# Patient Record
Sex: MALE | Race: WHITE | Employment: FULL TIME | URBAN - METROPOLITAN AREA
[De-identification: names, ages, dates, MRNs, and addresses within clinical notes are randomized per-mention and may not be internally consistent; named-entity substitution may affect disease eponyms.]

---

## 2022-03-20 ENCOUNTER — APPOINTMENT (OUTPATIENT)
Dept: GENERAL RADIOLOGY | Age: 72
End: 2022-03-20
Attending: EMERGENCY MEDICINE
Payer: MEDICARE

## 2022-03-20 ENCOUNTER — HOSPITAL ENCOUNTER (EMERGENCY)
Age: 72
Discharge: HOME | End: 2022-03-20
Attending: EMERGENCY MEDICINE
Payer: MEDICARE

## 2022-03-20 VITALS
WEIGHT: 200 LBS | HEART RATE: 81 BPM | BODY MASS INDEX: 30.31 KG/M2 | HEIGHT: 68 IN | RESPIRATION RATE: 17 BRPM | TEMPERATURE: 97.5 F | SYSTOLIC BLOOD PRESSURE: 161 MMHG | OXYGEN SATURATION: 97 % | DIASTOLIC BLOOD PRESSURE: 101 MMHG

## 2022-03-20 DIAGNOSIS — R07.9 CHEST PAIN, UNSPECIFIED TYPE: Primary | ICD-10-CM

## 2022-03-20 LAB
ALBUMIN SERPL-MCNC: 3.8 G/DL (ref 3.5–5)
ALBUMIN/GLOB SERPL: 1.2 {RATIO} (ref 1.1–2.2)
ALP SERPL-CCNC: 51 U/L (ref 45–117)
ALT SERPL-CCNC: 32 U/L (ref 12–78)
ANION GAP SERPL CALC-SCNC: 6 MMOL/L (ref 5–15)
AST SERPL-CCNC: 20 U/L (ref 15–37)
BASOPHILS # BLD: 0 K/UL (ref 0–0.1)
BASOPHILS NFR BLD: 1 % (ref 0–1)
BILIRUB SERPL-MCNC: 0.4 MG/DL (ref 0.2–1)
BUN SERPL-MCNC: 18 MG/DL (ref 6–20)
BUN/CREAT SERPL: 12 (ref 12–20)
CALCIUM SERPL-MCNC: 9.1 MG/DL (ref 8.5–10.1)
CHLORIDE SERPL-SCNC: 110 MMOL/L (ref 97–108)
CO2 SERPL-SCNC: 25 MMOL/L (ref 21–32)
CREAT SERPL-MCNC: 1.47 MG/DL (ref 0.7–1.3)
D DIMER PPP FEU-MCNC: 0.28 MG/L FEU (ref 0–0.65)
DIFFERENTIAL METHOD BLD: NORMAL
EOSINOPHIL # BLD: 0.1 K/UL (ref 0–0.4)
EOSINOPHIL NFR BLD: 2 % (ref 0–7)
ERYTHROCYTE [DISTWIDTH] IN BLOOD BY AUTOMATED COUNT: 13.6 % (ref 11.5–14.5)
GLOBULIN SER CALC-MCNC: 3.2 G/DL (ref 2–4)
GLUCOSE SERPL-MCNC: 97 MG/DL (ref 65–100)
HCT VFR BLD AUTO: 44.9 % (ref 36.6–50.3)
HGB BLD-MCNC: 15.3 G/DL (ref 12.1–17)
IMM GRANULOCYTES # BLD AUTO: 0 K/UL (ref 0–0.04)
IMM GRANULOCYTES NFR BLD AUTO: 0 % (ref 0–0.5)
LYMPHOCYTES # BLD: 1.6 K/UL (ref 0.8–3.5)
LYMPHOCYTES NFR BLD: 24 % (ref 12–49)
MCH RBC QN AUTO: 29.7 PG (ref 26–34)
MCHC RBC AUTO-ENTMCNC: 34.1 G/DL (ref 30–36.5)
MCV RBC AUTO: 87 FL (ref 80–99)
MONOCYTES # BLD: 0.6 K/UL (ref 0–1)
MONOCYTES NFR BLD: 8 % (ref 5–13)
NEUTS SEG # BLD: 4.4 K/UL (ref 1.8–8)
NEUTS SEG NFR BLD: 65 % (ref 32–75)
NRBC # BLD: 0 K/UL (ref 0–0.01)
NRBC BLD-RTO: 0 PER 100 WBC
PLATELET # BLD AUTO: 221 K/UL (ref 150–400)
PMV BLD AUTO: 9 FL (ref 8.9–12.9)
POTASSIUM SERPL-SCNC: 4.4 MMOL/L (ref 3.5–5.1)
PROT SERPL-MCNC: 7 G/DL (ref 6.4–8.2)
RBC # BLD AUTO: 5.16 M/UL (ref 4.1–5.7)
SODIUM SERPL-SCNC: 141 MMOL/L (ref 136–145)
TROPONIN-HIGH SENSITIVITY: 15 NG/L (ref 0–76)
TROPONIN-HIGH SENSITIVITY: 18 NG/L (ref 0–76)
WBC # BLD AUTO: 6.7 K/UL (ref 4.1–11.1)

## 2022-03-20 PROCEDURE — 99285 EMERGENCY DEPT VISIT HI MDM: CPT

## 2022-03-20 PROCEDURE — 84484 ASSAY OF TROPONIN QUANT: CPT

## 2022-03-20 PROCEDURE — 71046 X-RAY EXAM CHEST 2 VIEWS: CPT

## 2022-03-20 PROCEDURE — 36415 COLL VENOUS BLD VENIPUNCTURE: CPT

## 2022-03-20 PROCEDURE — 74011250636 HC RX REV CODE- 250/636: Performed by: PHYSICIAN ASSISTANT

## 2022-03-20 PROCEDURE — 93005 ELECTROCARDIOGRAM TRACING: CPT

## 2022-03-20 PROCEDURE — 74011250637 HC RX REV CODE- 250/637: Performed by: PHYSICIAN ASSISTANT

## 2022-03-20 PROCEDURE — 80053 COMPREHEN METABOLIC PANEL: CPT

## 2022-03-20 PROCEDURE — 85379 FIBRIN DEGRADATION QUANT: CPT

## 2022-03-20 PROCEDURE — 85025 COMPLETE CBC W/AUTO DIFF WBC: CPT

## 2022-03-20 RX ORDER — GUAIFENESIN 100 MG/5ML
162 LIQUID (ML) ORAL DAILY
Status: DISCONTINUED | OUTPATIENT
Start: 2022-03-20 | End: 2022-03-20

## 2022-03-20 RX ADMIN — SODIUM CHLORIDE 1000 ML: 9 INJECTION, SOLUTION INTRAVENOUS at 13:44

## 2022-03-20 RX ADMIN — ASPIRIN 162 MG: 81 TABLET, CHEWABLE ORAL at 12:03

## 2022-03-20 NOTE — ED PROVIDER NOTES
74 y/o male presenting with complaint of chest pain. The patient states that for the past 2 months he has had intermittent episodes of exertional chest pain which resolved with rest.  This morning around 8:00 he was walking his dog and noticed the chest pain again, somewhat worse than previous episodes, prompting his visit to the ED. The pain has resolved at this time. He denies any associated diaphoresis, lightheadedness, shortness of breath, nausea or vomiting. He has not seen a doctor in a long time and has no known medical diagnoses. No recent surgeries/hospitalizations, long travel, history of malignancy, hemoptysis, asymmetrical leg pain/swelling, or previous DVT/PE. The history is provided by the patient. No past medical history on file. No past surgical history on file. No family history on file. Social History     Socioeconomic History    Marital status:      Spouse name: Not on file    Number of children: Not on file    Years of education: Not on file    Highest education level: Not on file   Occupational History    Not on file   Tobacco Use    Smoking status: Not on file    Smokeless tobacco: Not on file   Substance and Sexual Activity    Alcohol use: Not on file    Drug use: Not on file    Sexual activity: Not on file   Other Topics Concern    Not on file   Social History Narrative    Not on file     Social Determinants of Health     Financial Resource Strain:     Difficulty of Paying Living Expenses: Not on file   Food Insecurity:     Worried About Running Out of Food in the Last Year: Not on file    Stew of Food in the Last Year: Not on file   Transportation Needs:     Lack of Transportation (Medical): Not on file    Lack of Transportation (Non-Medical):  Not on file   Physical Activity:     Days of Exercise per Week: Not on file    Minutes of Exercise per Session: Not on file   Stress:     Feeling of Stress : Not on file   Social Connections:     Frequency of Communication with Friends and Family: Not on file    Frequency of Social Gatherings with Friends and Family: Not on file    Attends Sikh Services: Not on file    Active Member of Clubs or Organizations: Not on file    Attends Club or Organization Meetings: Not on file    Marital Status: Not on file   Intimate Partner Violence:     Fear of Current or Ex-Partner: Not on file    Emotionally Abused: Not on file    Physically Abused: Not on file    Sexually Abused: Not on file   Housing Stability:     Unable to Pay for Housing in the Last Year: Not on file    Number of [place] in the Last Year: Not on file    Unstable Housing in the Last Year: Not on file         ALLERGIES: Patient has no known allergies. Review of Systems   Constitutional: Negative for chills, diaphoresis and fever. HENT: Negative for congestion. Respiratory: Negative for cough and shortness of breath. Cardiovascular: Positive for chest pain. Negative for palpitations and leg swelling. Gastrointestinal: Negative for diarrhea, nausea and vomiting. Musculoskeletal: Negative for myalgias. Skin: Negative for wound. Neurological: Negative for syncope and light-headedness. Vitals:    03/20/22 1058   BP: (!) 162/100   Pulse: 83   Resp: 20   Temp: 97.5 °F (36.4 °C)   SpO2: 96%   Weight: 90.7 kg (200 lb)   Height: 5' 8\" (1.727 m)            Physical Exam  Vitals and nursing note reviewed. Constitutional:       General: He is not in acute distress. Appearance: He is well-developed. He is not diaphoretic. HENT:      Head: Normocephalic and atraumatic. Eyes:      Conjunctiva/sclera: Conjunctivae normal.   Cardiovascular:      Rate and Rhythm: Normal rate and regular rhythm. Heart sounds: Normal heart sounds. Pulmonary:      Effort: Pulmonary effort is normal.      Breath sounds: Normal breath sounds. Chest:      Comments: No chest wall tenderness.   Abdominal:      General: There is no distension. Palpations: Abdomen is soft. Tenderness: There is no abdominal tenderness. There is no guarding. Skin:     General: Skin is warm and dry. Neurological:      Mental Status: He is alert and oriented to person, place, and time. [facility]  ED Course as of 03/20/22 1155   Sun Mar 20, 2022   1054 EKG obtained at 1052 showing sinus rhythm, rate 86, normal intervals, normal axis, no acute appearing findings, no prior EKG available for comparison. [JE]      ED Course User Index  [JE] Krys Weiss MD       Procedures        72 y/o male presenting with complaint of chest pain. The patient is well-appearing in no acute distress. D-dimer negative, low clinical suspicion for PE. EKG without acute ischemic changes. Initial HS-troponin 15, repeat 18. Will consult cardiology. Consult Note - 3:15 PM  I have spoken with Dr. Nella Laird, discussed patient presentation, exam and diagnostic results. Plan is for outpatient stress test tomorrow or the next day. Plan of care discussed with the patient, who verbalizes understanding and agreement. Strict ED return precautions discussed and provided in writing at time of discharge.

## 2022-03-20 NOTE — ED TRIAGE NOTES
Patient arrives with c/o intermittent mid-sternal chest pain/pressure x 2 months. Reports having covid-19 in January. Denies pain radiating to other areas, N/V, cough, SOB. States pian 0/10 in triage.

## 2022-03-21 ENCOUNTER — TELEPHONE (OUTPATIENT)
Dept: CARDIOLOGY CLINIC | Age: 72
End: 2022-03-21

## 2022-03-21 DIAGNOSIS — R07.9 CHEST PAIN, UNSPECIFIED TYPE: Primary | ICD-10-CM

## 2022-03-21 NOTE — TELEPHONE ENCOUNTER
Patient was seen in the [facility] ER 03/20/2022; patient was advised that someone from Dr. Lily Cornelius office would contact him for follow up.  He states ho one has called him              PHONE:[phone]

## 2022-03-22 ENCOUNTER — TELEPHONE (OUTPATIENT)
Dept: CARDIOLOGY CLINIC | Age: 72
End: 2022-03-22

## 2022-03-22 LAB
ATRIAL RATE: 86 BPM
CALCULATED P AXIS, ECG09: 61 DEGREES
CALCULATED R AXIS, ECG10: 55 DEGREES
CALCULATED T AXIS, ECG11: 88 DEGREES
DIAGNOSIS, 93000: NORMAL
P-R INTERVAL, ECG05: 174 MS
Q-T INTERVAL, ECG07: 334 MS
QRS DURATION, ECG06: 74 MS
QTC CALCULATION (BEZET), ECG08: 399 MS
VENTRICULAR RATE, ECG03: 86 BPM

## 2022-03-22 NOTE — TELEPHONE ENCOUNTER
All orders entered per verbal orders of Dr. Luis Eduardo Lopez       needs a stress Nuc and CAC for chest pain when available. Also an appt with me in next two weeks. Unable to reach pt.  Message left \"for call back\"

## 2022-03-22 NOTE — TELEPHONE ENCOUNTER
Spoke with The pt, identified the pt with name and . Informed the pt of Dr Kelly Leblanc of test. Explained both test and gave the following Lexiscan instructions:    During this test there is a lot of waiting time, so bring something to help you pass the time. Wear comfortable clothing (shorts or pants with a shirt or blouse)For this test you will be getting IV medication. Please wear short sleeves or stretchy material sleeves. Please do not wear any metal on the front of your shirt, like snaps or zipper. And no underwire bras    Do not eat or drink anything, except water, for at least 4 hours prior to your appointment. Avoid tobacco products for at least 12 hour prior to your test.    Do not drink or eat anything containing caffeine, including but not limited to the following, chocolate, regular and decaffeinated coffee, soft drinks, or tea for at least 24 hours prior to your test. Please check any migrain medication and multivitamins, they sometimes have caffeine in them. IF YOU CONSUME CAFFEINE WE CAN NOT DO THE TEST. Do not hold your scheduled medication the morning prior to your test. If you are a diabetic, please ask your physician how to adjust your food and insulin prior to your test. Please bring all medications you are currently taking. You will need to inform our office if you are pregnant, nursing, or think you may be pregnant. Your test will be performed on a 1 day protocol. This is determined by your height, weight, and other risk factors. For a 2 day test, please allow for 2 hours in the office each day. For a 1 day test, please allow for 4 hours in the office that day.     The radioactive isotope used for your testing is different from any of the dyes that are commonly used in x-ray procedures, and is ordered specially for your test.     Please call to cancel or reschedule your appointment at least 24 hours prior to your scheduled appointment to avoid being billed for the expensive isotope. I obtained an e mail to which I can send these instructions. [email or web address]

## 2022-03-31 ENCOUNTER — TELEPHONE (OUTPATIENT)
Dept: CARDIOLOGY CLINIC | Age: 72
End: 2022-03-31

## 2022-03-31 NOTE — TELEPHONE ENCOUNTER
Patient verified per protocol; Nuclear stress test appointment reminder given with date, time, location, and prep. Patient verbalized understanding.

## 2022-04-01 ENCOUNTER — OFFICE VISIT (OUTPATIENT)
Dept: CARDIOLOGY CLINIC | Age: 72
End: 2022-04-01
Payer: MEDICARE

## 2022-04-01 ENCOUNTER — TELEPHONE (OUTPATIENT)
Dept: CARDIOLOGY CLINIC | Age: 72
End: 2022-04-01

## 2022-04-01 ENCOUNTER — ANCILLARY PROCEDURE (OUTPATIENT)
Dept: CARDIOLOGY CLINIC | Age: 72
End: 2022-04-01
Payer: MEDICARE

## 2022-04-01 VITALS
HEART RATE: 77 BPM | DIASTOLIC BLOOD PRESSURE: 79 MMHG | SYSTOLIC BLOOD PRESSURE: 128 MMHG | OXYGEN SATURATION: 98 % | BODY MASS INDEX: 30.01 KG/M2 | HEIGHT: 68 IN | WEIGHT: 198 LBS

## 2022-04-01 VITALS — WEIGHT: 200 LBS | HEIGHT: 68 IN | BODY MASS INDEX: 30.31 KG/M2

## 2022-04-01 DIAGNOSIS — I20.8 STABLE ANGINA PECTORIS (HCC): Primary | ICD-10-CM

## 2022-04-01 DIAGNOSIS — R07.9 CHEST PAIN, UNSPECIFIED TYPE: ICD-10-CM

## 2022-04-01 DIAGNOSIS — Z09 HOSPITAL DISCHARGE FOLLOW-UP: ICD-10-CM

## 2022-04-01 LAB
NUC STRESS EJECTION FRACTION: 58 %
STRESS BASELINE DIAS BP: 96 MMHG
STRESS BASELINE HR: 71 BPM
STRESS BASELINE ST DEPRESSION: 0 MM
STRESS BASELINE SYS BP: 152 MMHG
STRESS ESTIMATED WORKLOAD: 1 METS
STRESS EXERCISE DUR MIN: 0 MIN
STRESS EXERCISE DUR SEC: 0 SEC
STRESS O2 SAT PEAK: 98 %
STRESS O2 SAT REST: 98 %
STRESS PEAK DIAS BP: 82 MMHG
STRESS PEAK SYS BP: 122 MMHG
STRESS PERCENT HR ACHIEVED: 64 %
STRESS POST PEAK HR: 97 BPM
STRESS RATE PRESSURE PRODUCT: NORMAL BPM*MMHG
STRESS ST DEPRESSION: 0 MM
STRESS TARGET HR: 151 BPM

## 2022-04-01 PROCEDURE — A9500 TC99M SESTAMIBI: HCPCS | Performed by: INTERNAL MEDICINE

## 2022-04-01 PROCEDURE — 99204 OFFICE O/P NEW MOD 45 MIN: CPT | Performed by: INTERNAL MEDICINE

## 2022-04-01 PROCEDURE — G0463 HOSPITAL OUTPT CLINIC VISIT: HCPCS | Performed by: INTERNAL MEDICINE

## 2022-04-01 PROCEDURE — 1111F DSCHRG MED/CURRENT MED MERGE: CPT | Performed by: INTERNAL MEDICINE

## 2022-04-01 PROCEDURE — 93018 CV STRESS TEST I&R ONLY: CPT | Performed by: INTERNAL MEDICINE

## 2022-04-01 PROCEDURE — 78452 HT MUSCLE IMAGE SPECT MULT: CPT | Performed by: INTERNAL MEDICINE

## 2022-04-01 PROCEDURE — 93017 CV STRESS TEST TRACING ONLY: CPT | Performed by: INTERNAL MEDICINE

## 2022-04-01 PROCEDURE — 93016 CV STRESS TEST SUPVJ ONLY: CPT | Performed by: INTERNAL MEDICINE

## 2022-04-01 RX ORDER — ROSUVASTATIN CALCIUM 20 MG/1
20 TABLET, COATED ORAL
Qty: 90 TABLET | Refills: 4 | Status: SHIPPED | OUTPATIENT
Start: 2022-04-01

## 2022-04-01 RX ORDER — NITROGLYCERIN 0.4 MG/1
0.4 TABLET SUBLINGUAL AS NEEDED
Status: DISCONTINUED | OUTPATIENT
Start: 2022-04-01 | End: 2022-04-01 | Stop reason: SDUPTHER

## 2022-04-01 RX ORDER — TETRAKIS(2-METHOXYISOBUTYLISOCYANIDE)COPPER(I) TETRAFLUOROBORATE 1 MG/ML
8.8 INJECTION, POWDER, LYOPHILIZED, FOR SOLUTION INTRAVENOUS ONCE
Status: COMPLETED | OUTPATIENT
Start: 2022-04-01 | End: 2022-04-01

## 2022-04-01 RX ORDER — ASPIRIN 81 MG/1
81 TABLET ORAL DAILY
Qty: 90 TABLET | Refills: 4 | Status: SHIPPED | OUTPATIENT
Start: 2022-04-01

## 2022-04-01 RX ORDER — AMINOPHYLLINE 25 MG/ML
100 INJECTION, SOLUTION INTRAVENOUS ONCE
Status: COMPLETED | OUTPATIENT
Start: 2022-04-01 | End: 2022-04-01

## 2022-04-01 RX ORDER — LISINOPRIL 10 MG/1
10 TABLET ORAL DAILY
COMMUNITY

## 2022-04-01 RX ORDER — NITROGLYCERIN 0.4 MG/1
TABLET SUBLINGUAL
Qty: 25 TABLET | Refills: 4 | Status: SHIPPED | OUTPATIENT
Start: 2022-04-01

## 2022-04-01 RX ORDER — TETRAKIS(2-METHOXYISOBUTYLISOCYANIDE)COPPER(I) TETRAFLUOROBORATE 1 MG/ML
24.6 INJECTION, POWDER, LYOPHILIZED, FOR SOLUTION INTRAVENOUS ONCE
Status: COMPLETED | OUTPATIENT
Start: 2022-04-01 | End: 2022-04-01

## 2022-04-01 RX ADMIN — TECHNETIUM TC 99M SESTAMIBI 8.8 MILLICURIE: 1 INJECTION, POWDER, FOR SOLUTION INTRAVENOUS at 08:15

## 2022-04-01 RX ADMIN — AMINOPHYLLINE 100 MG: 25 INJECTION, SOLUTION INTRAVENOUS at 09:49

## 2022-04-01 RX ADMIN — TECHNETIUM TC 99M SESTAMIBI 24.6 MILLICURIE: 1 INJECTION, POWDER, FOR SOLUTION INTRAVENOUS at 09:30

## 2022-04-01 RX ADMIN — REGADENOSON 0.4 MG: 0.08 INJECTION, SOLUTION INTRAVENOUS at 09:41

## 2022-04-01 NOTE — PROGRESS NOTES
Reason to see patient: Chest pain. Referring: Bette Pearce MD    HPI: Gallo Reyes is a 73 y.o. male with no significant past medical history is here for evaluation of symptoms of chest pain. Symptoms started few months ago and the first episode was in the December 2021 when he had an episode of chest pain which lasted for few minutes it was moderate intensity however it resolved spontaneously and thereafter he did not have symptoms until recentlyOn March 19 while he was walking his dog has symptoms of similar chest pain which improved upon resting. He had another bout of chest pain the following day while he was active and associated with the symptoms of chest pain he also felt some tingling or numbness sensation in both the arms. Having a third episode of chest pain he came to the ER by which time his symptoms of chest pain had resolved. His EKG in the ER performed on March 20, 2022 was personally reviewed. EKG at that time did not demonstrate anything significant. His cardiac enzyme initially was troponin at 15 with a follow-up at 18. ER consultation with the ER physician at that time decision making was to discharge him from the ER and to follow-up with a stress test.  He is coming into our office today and had a stress test performed. Of note he does not have any previous cardiac history. He does not smoke tobacco.  His only risk factor from the family history standpoint as his maternal grandmother had CAD. EKG performed on March 20, 2022 was personally reviewed. EKG demonstrated normal sinus rhythm, normal axis, normal intervals, nonspecific T wave changes in leads V1 and V2. Lab results including troponins were personally reviewed. Plan:    1. Chest pain: Symptoms classic for angina. EKG nonischemic.   Troponins were normal.  Stress test today in our office demonstrated medium sized area of moderate intensity ischemia of the anteroapical wall indicating significant disease in the mid LAD. All other myocardial territories appear to be normally perfusing. He did have effect of Lexiscan with significant drop in his blood pressure at the time of the stress test which promptly improved on reversing the agents with aminophhyllin. We will proceed with left heart catheterization. Risk and benefits were explained. He is in agreement to proceed. In the meantime I have asked him to start aspirin 81 mg p.o. daily, I will also start him on Crestor 20 mg p.o. daily. I will give him nitroglycerin 0.4 mg tablets to be taken as needed for chest pain. Also we discussed about if he has increasing anginal symptoms then do not wait and come to the ER for evaluation and further management. 2.  Recent diagnosis of hypertension: He was started on lisinopril. Continue this. ATTENTION:   This medical record was transcribed using an electronic medical records/speech recognition system. Although proofread, it may and can contain electronic, spelling and other errors. Corrections may be executed at a later time. Please feel free to contact us for any clarifications as needed.            Social History     Socioeconomic History    Marital status:      Spouse name: Not on file    Number of children: Not on file    Years of education: Not on file    Highest education level: Not on file   Occupational History    Not on file   Tobacco Use    Smoking status: Never Smoker    Smokeless tobacco: Never Used   Substance and Sexual Activity    Alcohol use: Not Currently     Alcohol/week: 2.0 standard drinks     Types: 2 Shots of liquor per week    Drug use: Not on file    Sexual activity: Not on file   Other Topics Concern    Not on file   Social History Narrative    Not on file     Social Determinants of Health     Financial Resource Strain:     Difficulty of Paying Living Expenses: Not on file   Food Insecurity:     Worried About Running Out of Food in the Last Year: Not on file  Ran Out of Food in the Last Year: Not on file   Transportation Needs:     Lack of Transportation (Medical): Not on file    Lack of Transportation (Non-Medical): Not on file   Physical Activity:     Days of Exercise per Week: Not on file    Minutes of Exercise per Session: Not on file   Stress:     Feeling of Stress : Not on file   Social Connections:     Frequency of Communication with Friends and Family: Not on file    Frequency of Social Gatherings with Friends and Family: Not on file    Attends Gnosticist Services: Not on file    Active Member of [address] [place] [address] or Organizations: Not on file    Attends Club or Organization Meetings: Not on file    Marital Status: Not on file   Intimate Partner Violence:     Fear of Current or Ex-Partner: Not on file    Emotionally Abused: Not on file    Physically Abused: Not on file    Sexually Abused: Not on file   Housing Stability:     Unable to Pay for Housing in the Last Year: Not on file    Number of [place] in the Last Year: Not on file    Unstable Housing in the Last Year: Not on file         No Known Allergies         Current Outpatient Medications   Medication Sig Dispense Refill    lisinopriL (PRINIVIL, ZESTRIL) 10 mg tablet Take 10 mg by mouth daily. ROS:  12 point review of systems was performed.  All negative except for HPI     Physical Exam:  Visit Vitals  /79 (BP 1 Location: Left upper arm, BP Patient Position: Sitting)   Pulse 77   Ht 5' 8\" (1.727 m)   Wt 198 lb (89.8 kg)   SpO2 98%   BMI 30.11 kg/m²       Gen:  Well-developed, well-nourished, in no acute distress  HEENT:  Pink conjunctivae, PERRL, hearing intact to voice, moist mucous membranes  Neck:  Supple, without masses, thyroid non-tender  Resp:  No accessory muscle use, clear breath sounds without wheezes rales or rhonchi  Card:  No murmurs, normal S1, S2 without thrills, bruits or peripheral edema  Abd:  Soft, non-tender, non-distended, normoactive bowel sounds are present, no palpable organomegaly and no detectable hernias  Lymph:  No cervical or inguinal adenopathy  Musc:  No cyanosis or clubbing  Skin:  No rashes or ulcers, skin turgor is good  Neuro:  Cranial nerves are grossly intact, no focal motor weakness, follows commands appropriately  Psych:  Good insight, oriented to person, place and time, alert     Labs:     Lab Results   Component Value Date/Time    WBC 6.7 03/20/2022 11:50 AM    HGB 15.3 03/20/2022 11:50 AM    HCT 44.9 03/20/2022 11:50 AM    PLATELET 183 32/99/2072 11:50 AM    MCV 87.0 03/20/2022 11:50 AM     Lab Results   Component Value Date/Time    Glucose 97 03/20/2022 11:50 AM    Creatinine 1.47 (H) 03/20/2022 11:50 AM      No results found for: CHOL, CHOLPOCT, HDL, LDL, LDLC, LDLCPOC, LDLCEXT, TRIGL, TGLPOCT, CHHD, CHHDX  Lab Results   Component Value Date/Time    ALT (SGPT) 32 03/20/2022 11:50 AM    Alk.  phosphatase 51 03/20/2022 11:50 AM    Bilirubin, total 0.4 03/20/2022 11:50 AM    Albumin 3.8 03/20/2022 11:50 AM    Protein, total 7.0 03/20/2022 11:50 AM    PLATELET 461 03/99/1452 11:50 AM     No results found for: INR, INREXT, PTMR, PTP, PT1, PT2, INREXT   Lab Results   Component Value Date/Time    GFR est non-AA 47 (L) 03/20/2022 11:50 AM    GFR est AA 58 (L) 03/20/2022 11:50 AM    Creatinine 1.47 (H) 03/20/2022 11:50 AM    BUN 18 03/20/2022 11:50 AM    Sodium 141 03/20/2022 11:50 AM    Potassium 4.4 03/20/2022 11:50 AM    Chloride 110 (H) 03/20/2022 11:50 AM    CO2 25 03/20/2022 11:50 AM     No results found for: PSA, PSA2, PSAR1, PSA1, PSAR2, PSA3, PSAR3, [id number], [id number]  No results found for: TSH, TSH2, TSH3, TSHP, TSHELE, TSHEXT, TT3, T3U, T3UP, FRT3, FT3, FT4, FT4P, T4, T4P, FT4T, TT7, TSHEXT   Lab Results   Component Value Date/Time    Glucose 97 03/20/2022 11:50 AM      No results found for: CPK, RCK1, RCK2, RCK3, RCK4, CKMB, CKNDX, CKND1, TROPT, TROIQ, BNPP, BNP   No results found for: BNP, BNPP, BNPPPOC, XBNPT, BNPNT   Lab Results Component Value Date/Time    Sodium 141 03/20/2022 11:50 AM    Potassium 4.4 03/20/2022 11:50 AM    Chloride 110 (H) 03/20/2022 11:50 AM    CO2 25 03/20/2022 11:50 AM    Anion gap 6 03/20/2022 11:50 AM    Glucose 97 03/20/2022 11:50 AM    BUN 18 03/20/2022 11:50 AM    Creatinine 1.47 (H) 03/20/2022 11:50 AM    BUN/Creatinine ratio 12 03/20/2022 11:50 AM    GFR est AA 58 (L) 03/20/2022 11:50 AM    GFR est non-AA 47 (L) 03/20/2022 11:50 AM    Calcium 9.1 03/20/2022 11:50 AM      Lab Results   Component Value Date/Time    Sodium 141 03/20/2022 11:50 AM    Potassium 4.4 03/20/2022 11:50 AM    Chloride 110 (H) 03/20/2022 11:50 AM    CO2 25 03/20/2022 11:50 AM    Anion gap 6 03/20/2022 11:50 AM    Glucose 97 03/20/2022 11:50 AM    BUN 18 03/20/2022 11:50 AM    Creatinine 1.47 (H) 03/20/2022 11:50 AM    BUN/Creatinine ratio 12 03/20/2022 11:50 AM    GFR est AA 58 (L) 03/20/2022 11:50 AM    GFR est non-AA 47 (L) 03/20/2022 11:50 AM    Calcium 9.1 03/20/2022 11:50 AM    Bilirubin, total 0.4 03/20/2022 11:50 AM    ALT (SGPT) 32 03/20/2022 11:50 AM    Alk. phosphatase 51 03/20/2022 11:50 AM    Protein, total 7.0 03/20/2022 11:50 AM    Albumin 3.8 03/20/2022 11:50 AM    Globulin 3.2 03/20/2022 11:50 AM    A-G Ratio 1.2 03/20/2022 11:50 AM      No results found for: HBA1C, MAV5RBPN, UMM8KJZD, NWL7GULP      No results for input(s): CPK, CKMB, TROIQ in the last 72 hours. No lab exists for component: CKQMB, CPKMB      Jose Khan MD, [facility] [place]

## 2022-04-01 NOTE — TELEPHONE ENCOUNTER
Refill per VO of Dr. Darlene Torres:    Last appt: Visit date not found    No future appointments. Requested Prescriptions     Pending Prescriptions Disp Refills    nitroglycerin (NITROSTAT) 0.4 mg SL tablet 25 Tablet 4     Sig: Dissolve one tablet under tongue as needed for chest pain. Wait 5 minutes, if no relief, take one more tablet, call 911. Can take up to 3 doses. Unable to reach pt.  Message left \"that I have resent the Rx\"

## 2022-04-01 NOTE — TELEPHONE ENCOUNTER
Patient is calling to check on the medication Nitroglycerin 0.4mg , would like to know when it would be filled. Please Advise. Pharmacy Verified.     [phone]

## 2022-04-01 NOTE — PROGRESS NOTES
Chief Complaint   Patient presents with    Cardiac Testing     NUC.  STRESS THIS MORNING    Chest Pain   [facility] Follow Up     ED 3/20=CP     Visit Vitals  /79 (BP 1 Location: Left upper arm, BP Patient Position: Sitting)   Pulse 77   Ht 5' 8\" (1.727 m)   Wt 198 lb (89.8 kg)   SpO2 98%   BMI 30.11 kg/m²     Chest pain OCCASIONALLY WITH EXCERTION    Palpations denied    SOB denied    Dizziness denied    Swelling in hands/feet denied     Recent hospital stays denied

## 2022-04-01 NOTE — H&P (VIEW-ONLY)
Reason to see patient: Chest pain. Referring: Bette Pearce MD    HPI: Gallo Reyes is a 73 y.o. male with no significant past medical history is here for evaluation of symptoms of chest pain. Symptoms started few months ago and the first episode was in the December 2021 when he had an episode of chest pain which lasted for few minutes it was moderate intensity however it resolved spontaneously and thereafter he did not have symptoms until recentlyOn March 19 while he was walking his dog has symptoms of similar chest pain which improved upon resting. He had another bout of chest pain the following day while he was active and associated with the symptoms of chest pain he also felt some tingling or numbness sensation in both the arms. Having a third episode of chest pain he came to the ER by which time his symptoms of chest pain had resolved. His EKG in the ER performed on March 20, 2022 was personally reviewed. EKG at that time did not demonstrate anything significant. His cardiac enzyme initially was troponin at 15 with a follow-up at 18. ER consultation with the ER physician at that time decision making was to discharge him from the ER and to follow-up with a stress test.  He is coming into our office today and had a stress test performed. Of note he does not have any previous cardiac history. He does not smoke tobacco.  His only risk factor from the family history standpoint as his maternal grandmother had CAD. EKG performed on March 20, 2022 was personally reviewed. EKG demonstrated normal sinus rhythm, normal axis, normal intervals, nonspecific T wave changes in leads V1 and V2. Lab results including troponins were personally reviewed. Plan:    1. Chest pain: Symptoms classic for angina. EKG nonischemic.   Troponins were normal.  Stress test today in our office demonstrated medium sized area of moderate intensity ischemia of the anteroapical wall indicating significant disease in the mid LAD. All other myocardial territories appear to be normally perfusing. He did have effect of Lexiscan with significant drop in his blood pressure at the time of the stress test which promptly improved on reversing the agents with aminophhyllin. We will proceed with left heart catheterization. Risk and benefits were explained. He is in agreement to proceed. In the meantime I have asked him to start aspirin 81 mg p.o. daily, I will also start him on Crestor 20 mg p.o. daily. I will give him nitroglycerin 0.4 mg tablets to be taken as needed for chest pain. Also we discussed about if he has increasing anginal symptoms then do not wait and come to the ER for evaluation and further management. 2.  Recent diagnosis of hypertension: He was started on lisinopril. Continue this. ATTENTION:   This medical record was transcribed using an electronic medical records/speech recognition system. Although proofread, it may and can contain electronic, spelling and other errors. Corrections may be executed at a later time. Please feel free to contact us for any clarifications as needed.            Social History     Socioeconomic History    Marital status:      Spouse name: Not on file    Number of children: Not on file    Years of education: Not on file    Highest education level: Not on file   Occupational History    Not on file   Tobacco Use    Smoking status: Never Smoker    Smokeless tobacco: Never Used   Substance and Sexual Activity    Alcohol use: Not Currently     Alcohol/week: 2.0 standard drinks     Types: 2 Shots of liquor per week    Drug use: Not on file    Sexual activity: Not on file   Other Topics Concern    Not on file   Social History Narrative    Not on file     Social Determinants of Health     Financial Resource Strain:     Difficulty of Paying Living Expenses: Not on file   Food Insecurity:     Worried About Running Out of Food in the Last Year: Not on file  Ran Out of Food in the Last Year: Not on file   Transportation Needs:     Lack of Transportation (Medical): Not on file    Lack of Transportation (Non-Medical): Not on file   Physical Activity:     Days of Exercise per Week: Not on file    Minutes of Exercise per Session: Not on file   Stress:     Feeling of Stress : Not on file   Social Connections:     Frequency of Communication with Friends and Family: Not on file    Frequency of Social Gatherings with Friends and Family: Not on file    Attends Hoahaoism Services: Not on file    Active Member of [address] [place] [address] or Organizations: Not on file    Attends Club or Organization Meetings: Not on file    Marital Status: Not on file   Intimate Partner Violence:     Fear of Current or Ex-Partner: Not on file    Emotionally Abused: Not on file    Physically Abused: Not on file    Sexually Abused: Not on file   Housing Stability:     Unable to Pay for Housing in the Last Year: Not on file    Number of [place] in the Last Year: Not on file    Unstable Housing in the Last Year: Not on file         No Known Allergies         Current Outpatient Medications   Medication Sig Dispense Refill    lisinopriL (PRINIVIL, ZESTRIL) 10 mg tablet Take 10 mg by mouth daily. ROS:  12 point review of systems was performed.  All negative except for HPI     Physical Exam:  Visit Vitals  /79 (BP 1 Location: Left upper arm, BP Patient Position: Sitting)   Pulse 77   Ht 5' 8\" (1.727 m)   Wt 198 lb (89.8 kg)   SpO2 98%   BMI 30.11 kg/m²       Gen:  Well-developed, well-nourished, in no acute distress  HEENT:  Pink conjunctivae, PERRL, hearing intact to voice, moist mucous membranes  Neck:  Supple, without masses, thyroid non-tender  Resp:  No accessory muscle use, clear breath sounds without wheezes rales or rhonchi  Card:  No murmurs, normal S1, S2 without thrills, bruits or peripheral edema  Abd:  Soft, non-tender, non-distended, normoactive bowel sounds are present, no palpable organomegaly and no detectable hernias  Lymph:  No cervical or inguinal adenopathy  Musc:  No cyanosis or clubbing  Skin:  No rashes or ulcers, skin turgor is good  Neuro:  Cranial nerves are grossly intact, no focal motor weakness, follows commands appropriately  Psych:  Good insight, oriented to person, place and time, alert     Labs:     Lab Results   Component Value Date/Time    WBC 6.7 03/20/2022 11:50 AM    HGB 15.3 03/20/2022 11:50 AM    HCT 44.9 03/20/2022 11:50 AM    PLATELET 069 64/12/5650 11:50 AM    MCV 87.0 03/20/2022 11:50 AM     Lab Results   Component Value Date/Time    Glucose 97 03/20/2022 11:50 AM    Creatinine 1.47 (H) 03/20/2022 11:50 AM      No results found for: CHOL, CHOLPOCT, HDL, LDL, LDLC, LDLCPOC, LDLCEXT, TRIGL, TGLPOCT, CHHD, CHHDX  Lab Results   Component Value Date/Time    ALT (SGPT) 32 03/20/2022 11:50 AM    Alk.  phosphatase 51 03/20/2022 11:50 AM    Bilirubin, total 0.4 03/20/2022 11:50 AM    Albumin 3.8 03/20/2022 11:50 AM    Protein, total 7.0 03/20/2022 11:50 AM    PLATELET 291 40/36/5518 11:50 AM     No results found for: INR, INREXT, PTMR, PTP, PT1, PT2, INREXT   Lab Results   Component Value Date/Time    GFR est non-AA 47 (L) 03/20/2022 11:50 AM    GFR est AA 58 (L) 03/20/2022 11:50 AM    Creatinine 1.47 (H) 03/20/2022 11:50 AM    BUN 18 03/20/2022 11:50 AM    Sodium 141 03/20/2022 11:50 AM    Potassium 4.4 03/20/2022 11:50 AM    Chloride 110 (H) 03/20/2022 11:50 AM    CO2 25 03/20/2022 11:50 AM     No results found for: PSA, PSA2, PSAR1, PSA1, PSAR2, PSA3, PSAR3, [id number], [id number]  No results found for: TSH, TSH2, TSH3, TSHP, TSHELE, TSHEXT, TT3, T3U, T3UP, FRT3, FT3, FT4, FT4P, T4, T4P, FT4T, TT7, TSHEXT   Lab Results   Component Value Date/Time    Glucose 97 03/20/2022 11:50 AM      No results found for: CPK, RCK1, RCK2, RCK3, RCK4, CKMB, CKNDX, CKND1, TROPT, TROIQ, BNPP, BNP   No results found for: BNP, BNPP, BNPPPOC, XBNPT, BNPNT   Lab Results Component Value Date/Time    Sodium 141 03/20/2022 11:50 AM    Potassium 4.4 03/20/2022 11:50 AM    Chloride 110 (H) 03/20/2022 11:50 AM    CO2 25 03/20/2022 11:50 AM    Anion gap 6 03/20/2022 11:50 AM    Glucose 97 03/20/2022 11:50 AM    BUN 18 03/20/2022 11:50 AM    Creatinine 1.47 (H) 03/20/2022 11:50 AM    BUN/Creatinine ratio 12 03/20/2022 11:50 AM    GFR est AA 58 (L) 03/20/2022 11:50 AM    GFR est non-AA 47 (L) 03/20/2022 11:50 AM    Calcium 9.1 03/20/2022 11:50 AM      Lab Results   Component Value Date/Time    Sodium 141 03/20/2022 11:50 AM    Potassium 4.4 03/20/2022 11:50 AM    Chloride 110 (H) 03/20/2022 11:50 AM    CO2 25 03/20/2022 11:50 AM    Anion gap 6 03/20/2022 11:50 AM    Glucose 97 03/20/2022 11:50 AM    BUN 18 03/20/2022 11:50 AM    Creatinine 1.47 (H) 03/20/2022 11:50 AM    BUN/Creatinine ratio 12 03/20/2022 11:50 AM    GFR est AA 58 (L) 03/20/2022 11:50 AM    GFR est non-AA 47 (L) 03/20/2022 11:50 AM    Calcium 9.1 03/20/2022 11:50 AM    Bilirubin, total 0.4 03/20/2022 11:50 AM    ALT (SGPT) 32 03/20/2022 11:50 AM    Alk. phosphatase 51 03/20/2022 11:50 AM    Protein, total 7.0 03/20/2022 11:50 AM    Albumin 3.8 03/20/2022 11:50 AM    Globulin 3.2 03/20/2022 11:50 AM    A-G Ratio 1.2 03/20/2022 11:50 AM      No results found for: HBA1C, HUX5FEIN, XCI0SPWH, WDP4OJTU      No results for input(s): CPK, CKMB, TROIQ in the last 72 hours. No lab exists for component: CKQMB, CPKMB      Jose Day MD, [facility] [place]

## 2022-04-01 NOTE — PROGRESS NOTES
All orders entered per verbal orders of Dr. Bony Henriquez    ASA 81mg po daily. Crestor 20mg po daily. NTG 0.4mg SL as needed. Schedule for [facility] next week. José Miguel Cherry, is scheduling    Refill per VO of Dr. Leslie Mancera:    Last appt: Visit date not found    Future Appointments   Date Time Provider Vinny Anderson   4/1/2022  1:00 PM Kimmy Theodore MD [facility] AMB       Requested Prescriptions     Pending Prescriptions Disp Refills    aspirin delayed-release 81 mg tablet 90 Tablet 4     Sig: Take 1 Tablet by mouth daily.  rosuvastatin (CRESTOR) 20 mg tablet 90 Tablet 4     Sig: Take 1 Tablet by mouth nightly.     nitroglycerin (NITROSTAT) tablet 0.4 mg

## 2022-04-05 DIAGNOSIS — R07.9 CHEST PAIN, UNSPECIFIED TYPE: Primary | ICD-10-CM

## 2022-04-05 RX ORDER — SODIUM CHLORIDE 0.9 % (FLUSH) 0.9 %
5-40 SYRINGE (ML) INJECTION EVERY 8 HOURS
Status: CANCELLED | OUTPATIENT
Start: 2022-04-06

## 2022-04-05 RX ORDER — SODIUM CHLORIDE 0.9 % (FLUSH) 0.9 %
5-40 SYRINGE (ML) INJECTION AS NEEDED
Status: CANCELLED | OUTPATIENT
Start: 2022-04-06

## 2022-04-05 RX ORDER — SODIUM CHLORIDE 9 MG/ML
100 INJECTION, SOLUTION INTRAVENOUS CONTINUOUS
Status: CANCELLED | OUTPATIENT
Start: 2022-04-06

## 2022-04-06 ENCOUNTER — HOSPITAL ENCOUNTER (INPATIENT)
Age: 72
LOS: 1 days | Discharge: HOME | DRG: 247 | End: 2022-04-07
Attending: STUDENT IN AN ORGANIZED HEALTH CARE EDUCATION/TRAINING PROGRAM | Admitting: STUDENT IN AN ORGANIZED HEALTH CARE EDUCATION/TRAINING PROGRAM
Payer: MEDICARE

## 2022-04-06 DIAGNOSIS — I25.10 CORONARY ARTERY DISEASE INVOLVING NATIVE CORONARY ARTERY OF NATIVE HEART, UNSPECIFIED WHETHER ANGINA PRESENT: ICD-10-CM

## 2022-04-06 DIAGNOSIS — R07.9 CHEST PAIN, UNSPECIFIED TYPE: ICD-10-CM

## 2022-04-06 DIAGNOSIS — I82.90 CLOT: ICD-10-CM

## 2022-04-06 DIAGNOSIS — I25.110 CORONARY ARTERY DISEASE INVOLVING NATIVE CORONARY ARTERY OF NATIVE HEART WITH UNSTABLE ANGINA PECTORIS (HCC): ICD-10-CM

## 2022-04-06 DIAGNOSIS — R07.9 CHEST PAIN: ICD-10-CM

## 2022-04-06 LAB
ACT BLD: 196 SECS (ref 79–138)
ACT BLD: 273 SECS (ref 79–138)
ACT BLD: 309 SECS (ref 79–138)
ACT BLD: 333 SECS (ref 79–138)

## 2022-04-06 PROCEDURE — C1894 INTRO/SHEATH, NON-LASER: HCPCS | Performed by: STUDENT IN AN ORGANIZED HEALTH CARE EDUCATION/TRAINING PROGRAM

## 2022-04-06 PROCEDURE — 92928 PRQ TCAT PLMT NTRAC ST 1 LES: CPT | Performed by: STUDENT IN AN ORGANIZED HEALTH CARE EDUCATION/TRAINING PROGRAM

## 2022-04-06 PROCEDURE — 74011000636 HC RX REV CODE- 636: Performed by: STUDENT IN AN ORGANIZED HEALTH CARE EDUCATION/TRAINING PROGRAM

## 2022-04-06 PROCEDURE — C1753 CATH, INTRAVAS ULTRASOUND: HCPCS | Performed by: STUDENT IN AN ORGANIZED HEALTH CARE EDUCATION/TRAINING PROGRAM

## 2022-04-06 PROCEDURE — 92978 ENDOLUMINL IVUS OCT C 1ST: CPT | Performed by: STUDENT IN AN ORGANIZED HEALTH CARE EDUCATION/TRAINING PROGRAM

## 2022-04-06 PROCEDURE — 93458 L HRT ARTERY/VENTRICLE ANGIO: CPT | Performed by: STUDENT IN AN ORGANIZED HEALTH CARE EDUCATION/TRAINING PROGRAM

## 2022-04-06 PROCEDURE — 77030013715 HC INFL SYS MRTM -B: Performed by: STUDENT IN AN ORGANIZED HEALTH CARE EDUCATION/TRAINING PROGRAM

## 2022-04-06 PROCEDURE — 77030008543 HC TBNG MON PRSS MRTM -A: Performed by: STUDENT IN AN ORGANIZED HEALTH CARE EDUCATION/TRAINING PROGRAM

## 2022-04-06 PROCEDURE — 65270000029 HC RM PRIVATE

## 2022-04-06 PROCEDURE — C1769 GUIDE WIRE: HCPCS | Performed by: STUDENT IN AN ORGANIZED HEALTH CARE EDUCATION/TRAINING PROGRAM

## 2022-04-06 PROCEDURE — B241ZZ3 ULTRASONOGRAPHY OF MULTIPLE CORONARY ARTERIES, INTRAVASCULAR: ICD-10-PCS | Performed by: STUDENT IN AN ORGANIZED HEALTH CARE EDUCATION/TRAINING PROGRAM

## 2022-04-06 PROCEDURE — C1874 STENT, COATED/COV W/DEL SYS: HCPCS | Performed by: STUDENT IN AN ORGANIZED HEALTH CARE EDUCATION/TRAINING PROGRAM

## 2022-04-06 PROCEDURE — 027034Z DILATION OF CORONARY ARTERY, ONE ARTERY WITH DRUG-ELUTING INTRALUMINAL DEVICE, PERCUTANEOUS APPROACH: ICD-10-PCS | Performed by: STUDENT IN AN ORGANIZED HEALTH CARE EDUCATION/TRAINING PROGRAM

## 2022-04-06 PROCEDURE — G0378 HOSPITAL OBSERVATION PER HR: HCPCS

## 2022-04-06 PROCEDURE — B2111ZZ FLUOROSCOPY OF MULTIPLE CORONARY ARTERIES USING LOW OSMOLAR CONTRAST: ICD-10-PCS | Performed by: STUDENT IN AN ORGANIZED HEALTH CARE EDUCATION/TRAINING PROGRAM

## 2022-04-06 PROCEDURE — 99153 MOD SED SAME PHYS/QHP EA: CPT | Performed by: STUDENT IN AN ORGANIZED HEALTH CARE EDUCATION/TRAINING PROGRAM

## 2022-04-06 PROCEDURE — 99152 MOD SED SAME PHYS/QHP 5/>YRS: CPT | Performed by: STUDENT IN AN ORGANIZED HEALTH CARE EDUCATION/TRAINING PROGRAM

## 2022-04-06 PROCEDURE — 93005 ELECTROCARDIOGRAM TRACING: CPT

## 2022-04-06 PROCEDURE — C1760 CLOSURE DEV, VASC: HCPCS | Performed by: STUDENT IN AN ORGANIZED HEALTH CARE EDUCATION/TRAINING PROGRAM

## 2022-04-06 PROCEDURE — C1887 CATHETER, GUIDING: HCPCS | Performed by: STUDENT IN AN ORGANIZED HEALTH CARE EDUCATION/TRAINING PROGRAM

## 2022-04-06 PROCEDURE — 2709999900 HC NON-CHARGEABLE SUPPLY: Performed by: STUDENT IN AN ORGANIZED HEALTH CARE EDUCATION/TRAINING PROGRAM

## 2022-04-06 PROCEDURE — 74011250637 HC RX REV CODE- 250/637: Performed by: STUDENT IN AN ORGANIZED HEALTH CARE EDUCATION/TRAINING PROGRAM

## 2022-04-06 PROCEDURE — B2151ZZ FLUOROSCOPY OF LEFT HEART USING LOW OSMOLAR CONTRAST: ICD-10-PCS | Performed by: STUDENT IN AN ORGANIZED HEALTH CARE EDUCATION/TRAINING PROGRAM

## 2022-04-06 PROCEDURE — 74011250636 HC RX REV CODE- 250/636: Performed by: STUDENT IN AN ORGANIZED HEALTH CARE EDUCATION/TRAINING PROGRAM

## 2022-04-06 PROCEDURE — 77030029065 HC DRSG HEMO QCLOT ZMED -B

## 2022-04-06 PROCEDURE — 74011000250 HC RX REV CODE- 250: Performed by: STUDENT IN AN ORGANIZED HEALTH CARE EDUCATION/TRAINING PROGRAM

## 2022-04-06 PROCEDURE — C1725 CATH, TRANSLUMIN NON-LASER: HCPCS | Performed by: STUDENT IN AN ORGANIZED HEALTH CARE EDUCATION/TRAINING PROGRAM

## 2022-04-06 PROCEDURE — 4A023N7 MEASUREMENT OF CARDIAC SAMPLING AND PRESSURE, LEFT HEART, PERCUTANEOUS APPROACH: ICD-10-PCS | Performed by: STUDENT IN AN ORGANIZED HEALTH CARE EDUCATION/TRAINING PROGRAM

## 2022-04-06 PROCEDURE — 85347 COAGULATION TIME ACTIVATED: CPT

## 2022-04-06 DEVICE — XIENCE SIERRA™ EVEROLIMUS ELUTING CORONARY STENT SYSTEM 3.00 MM X 28 MM / RAPID-EXCHANGE
Type: IMPLANTABLE DEVICE | Status: FUNCTIONAL
Brand: XIENCE SIERRA™

## 2022-04-06 RX ORDER — EPTIFIBATIDE 0.75 MG/ML
2 INJECTION, SOLUTION INTRAVENOUS CONTINUOUS
Status: DISPENSED | OUTPATIENT
Start: 2022-04-06 | End: 2022-04-07

## 2022-04-06 RX ORDER — PHENYLEPHRINE HYDROCHLORIDE 10 MG/ML
INJECTION INTRAVENOUS AS NEEDED
Status: DISCONTINUED | OUTPATIENT
Start: 2022-04-06 | End: 2022-04-06 | Stop reason: HOSPADM

## 2022-04-06 RX ORDER — SODIUM CHLORIDE 0.9 % (FLUSH) 0.9 %
5-40 SYRINGE (ML) INJECTION AS NEEDED
Status: DISCONTINUED | OUTPATIENT
Start: 2022-04-06 | End: 2022-04-07 | Stop reason: HOSPADM

## 2022-04-06 RX ORDER — HEPARIN SODIUM 1000 [USP'U]/ML
INJECTION, SOLUTION INTRAVENOUS; SUBCUTANEOUS AS NEEDED
Status: DISCONTINUED | OUTPATIENT
Start: 2022-04-06 | End: 2022-04-06 | Stop reason: HOSPADM

## 2022-04-06 RX ORDER — ONDANSETRON 4 MG/1
4 TABLET, ORALLY DISINTEGRATING ORAL
Status: DISCONTINUED | OUTPATIENT
Start: 2022-04-06 | End: 2022-04-07 | Stop reason: HOSPADM

## 2022-04-06 RX ORDER — ASPIRIN 81 MG/1
81 TABLET ORAL DAILY
Status: DISCONTINUED | OUTPATIENT
Start: 2022-04-07 | End: 2022-04-07 | Stop reason: HOSPADM

## 2022-04-06 RX ORDER — LISINOPRIL 5 MG/1
10 TABLET ORAL DAILY
Status: DISCONTINUED | OUTPATIENT
Start: 2022-04-07 | End: 2022-04-07 | Stop reason: HOSPADM

## 2022-04-06 RX ORDER — SODIUM CHLORIDE 9 MG/ML
100 INJECTION, SOLUTION INTRAVENOUS CONTINUOUS
Status: DISCONTINUED | OUTPATIENT
Start: 2022-04-06 | End: 2022-04-06 | Stop reason: HOSPADM

## 2022-04-06 RX ORDER — SODIUM CHLORIDE 0.9 % (FLUSH) 0.9 %
5-40 SYRINGE (ML) INJECTION EVERY 8 HOURS
Status: DISCONTINUED | OUTPATIENT
Start: 2022-04-06 | End: 2022-04-07 | Stop reason: HOSPADM

## 2022-04-06 RX ORDER — POLYETHYLENE GLYCOL 3350 17 G/17G
17 POWDER, FOR SOLUTION ORAL DAILY PRN
Status: DISCONTINUED | OUTPATIENT
Start: 2022-04-06 | End: 2022-04-07 | Stop reason: HOSPADM

## 2022-04-06 RX ORDER — ATROPINE SULFATE 0.1 MG/ML
INJECTION INTRAVENOUS AS NEEDED
Status: DISCONTINUED | OUTPATIENT
Start: 2022-04-06 | End: 2022-04-06 | Stop reason: HOSPADM

## 2022-04-06 RX ORDER — EPTIFIBATIDE 0.75 MG/ML
INJECTION, SOLUTION INTRAVENOUS
Status: COMPLETED | OUTPATIENT
Start: 2022-04-06 | End: 2022-04-06

## 2022-04-06 RX ORDER — ACETAMINOPHEN 650 MG/1
650 SUPPOSITORY RECTAL
Status: DISCONTINUED | OUTPATIENT
Start: 2022-04-06 | End: 2022-04-07 | Stop reason: HOSPADM

## 2022-04-06 RX ORDER — ONDANSETRON 2 MG/ML
4 INJECTION INTRAMUSCULAR; INTRAVENOUS
Status: DISCONTINUED | OUTPATIENT
Start: 2022-04-06 | End: 2022-04-07 | Stop reason: HOSPADM

## 2022-04-06 RX ORDER — HEPARIN SODIUM 200 [USP'U]/100ML
INJECTION, SOLUTION INTRAVENOUS
Status: COMPLETED | OUTPATIENT
Start: 2022-04-06 | End: 2022-04-06

## 2022-04-06 RX ORDER — MIDAZOLAM HYDROCHLORIDE 1 MG/ML
INJECTION, SOLUTION INTRAMUSCULAR; INTRAVENOUS AS NEEDED
Status: DISCONTINUED | OUTPATIENT
Start: 2022-04-06 | End: 2022-04-06 | Stop reason: HOSPADM

## 2022-04-06 RX ORDER — SODIUM CHLORIDE 0.9 % (FLUSH) 0.9 %
5-40 SYRINGE (ML) INJECTION AS NEEDED
Status: DISCONTINUED | OUTPATIENT
Start: 2022-04-06 | End: 2022-04-06 | Stop reason: HOSPADM

## 2022-04-06 RX ORDER — SODIUM CHLORIDE 9 MG/ML
150 INJECTION, SOLUTION INTRAVENOUS CONTINUOUS
Status: DISPENSED | OUTPATIENT
Start: 2022-04-06 | End: 2022-04-06

## 2022-04-06 RX ORDER — ACETAMINOPHEN 325 MG/1
500 TABLET ORAL
COMMUNITY

## 2022-04-06 RX ORDER — ROSUVASTATIN CALCIUM 10 MG/1
40 TABLET, COATED ORAL
Status: DISCONTINUED | OUTPATIENT
Start: 2022-04-06 | End: 2022-04-07 | Stop reason: HOSPADM

## 2022-04-06 RX ORDER — SODIUM CHLORIDE 0.9 % (FLUSH) 0.9 %
5-40 SYRINGE (ML) INJECTION EVERY 8 HOURS
Status: DISCONTINUED | OUTPATIENT
Start: 2022-04-06 | End: 2022-04-06 | Stop reason: HOSPADM

## 2022-04-06 RX ORDER — FENTANYL CITRATE 50 UG/ML
INJECTION, SOLUTION INTRAMUSCULAR; INTRAVENOUS AS NEEDED
Status: DISCONTINUED | OUTPATIENT
Start: 2022-04-06 | End: 2022-04-06 | Stop reason: HOSPADM

## 2022-04-06 RX ORDER — VERAPAMIL HYDROCHLORIDE 2.5 MG/ML
INJECTION, SOLUTION INTRAVENOUS AS NEEDED
Status: DISCONTINUED | OUTPATIENT
Start: 2022-04-06 | End: 2022-04-06 | Stop reason: HOSPADM

## 2022-04-06 RX ORDER — ACETAMINOPHEN 325 MG/1
650 TABLET ORAL
Status: DISCONTINUED | OUTPATIENT
Start: 2022-04-06 | End: 2022-04-07 | Stop reason: HOSPADM

## 2022-04-06 RX ORDER — LIDOCAINE HYDROCHLORIDE 10 MG/ML
INJECTION INFILTRATION; PERINEURAL AS NEEDED
Status: DISCONTINUED | OUTPATIENT
Start: 2022-04-06 | End: 2022-04-06 | Stop reason: HOSPADM

## 2022-04-06 RX ADMIN — ACETAMINOPHEN 650 MG: 325 TABLET ORAL at 19:27

## 2022-04-06 RX ADMIN — SODIUM CHLORIDE, PRESERVATIVE FREE 10 ML: 5 INJECTION INTRAVENOUS at 12:20

## 2022-04-06 RX ADMIN — ACETAMINOPHEN 650 MG: 325 TABLET ORAL at 13:30

## 2022-04-06 RX ADMIN — ROSUVASTATIN CALCIUM 40 MG: 10 TABLET, FILM COATED ORAL at 21:07

## 2022-04-06 RX ADMIN — EPTIFIBATIDE 2 MCG/KG/MIN: 0.75 INJECTION INTRAVENOUS at 20:54

## 2022-04-06 RX ADMIN — SODIUM CHLORIDE 150 ML/HR: 9 INJECTION, SOLUTION INTRAVENOUS at 12:18

## 2022-04-06 RX ADMIN — EPTIFIBATIDE 2 MCG/KG/MIN: 0.75 INJECTION INTRAVENOUS at 17:02

## 2022-04-06 RX ADMIN — SODIUM CHLORIDE, PRESERVATIVE FREE 10 ML: 5 INJECTION INTRAVENOUS at 22:00

## 2022-04-06 RX ADMIN — TICAGRELOR 90 MG: 90 TABLET ORAL at 21:08

## 2022-04-06 RX ADMIN — SODIUM CHLORIDE, PRESERVATIVE FREE 10 ML: 5 INJECTION INTRAVENOUS at 12:21

## 2022-04-06 NOTE — CARDIO/PULMONARY
[facility] Cardiopulmonary Rehab: 73yo male admitted for cardiac cath following abnormal stress test. S/P PCI with PATRIA to LAD, complicated by acute instent thrombosis requiring further intervention & Integrilin (4/6). LVEF 58%. Met with patient on CCU. His daughter Nicole De La Fuente) was also present. Pt indicated awareness that he needed a stent. Pt also aware of plan to return to cath lab today to ensure stent is open. PCI educational packet at bedside, with stent card. Reviewed benefits of outpatient cardiac rehab program. Pt expressed interest in participating in cardiac rehab at [facility]. Explained to patient that he will be called by Tuesday to schedule an intake appt. Provided intake packet. All questions were answered.

## 2022-04-06 NOTE — Clinical Note
Patient has a low blood pressure and decreased heart rate. MD assessing and nurse giving medication as ordered. Continuing to monitor.

## 2022-04-06 NOTE — Clinical Note
TRANSFER - OUT REPORT:     Verbal report given to: Alina. Report consisted of patient's Situation, Background, Assessment and   Recommendations(SBAR). Opportunity for questions and clarification was provided. Patient transported with a Registered Nurse and [address] [place] [address] / [facility]. Patient transported to: [facility].

## 2022-04-06 NOTE — Clinical Note
TRANSFER - OUT REPORT:     Verbal report given to: Dasia, (at bedside). Report consisted of patient's Situation, Background, Assessment and   Recommendations(SBAR). Opportunity for questions and clarification was provided. Patient transported with a Registered Nurse. Patient transported to: ICU, 11. ICU RN updated on patient statues in lab, verbal report to be given at bedside to receiving RN.

## 2022-04-06 NOTE — INTERVAL H&P NOTE
Update History & Physical  History and physical has been reviewed. The patient has been examined. There have been no significant clinical changes since the completion of the originally dated History and Physical.    Risk and benefit of cardiac catheterization/PCI was described in detail to patient.  (risk of vascular access complication with potential surgical intervention for management, stroke, myocardial infarction, emergent open heart surgery and death). Patient wishes to proceed with coronary angiography with possible intervention. Labs   Lab Results   Component Value Date/Time    Sodium 141 03/20/2022 11:50 AM    Potassium 4.4 03/20/2022 11:50 AM    Chloride 110 (H) 03/20/2022 11:50 AM    CO2 25 03/20/2022 11:50 AM    Anion gap 6 03/20/2022 11:50 AM    Glucose 97 03/20/2022 11:50 AM    BUN 18 03/20/2022 11:50 AM    Creatinine 1.47 (H) 03/20/2022 11:50 AM    BUN/Creatinine ratio 12 03/20/2022 11:50 AM    GFR est AA 58 (L) 03/20/2022 11:50 AM    GFR est non-AA 47 (L) 03/20/2022 11:50 AM    Calcium 9.1 03/20/2022 11:50 AM     Lab Results   Component Value Date/Time    WBC 6.7 03/20/2022 11:50 AM    HGB 15.3 03/20/2022 11:50 AM    HCT 44.9 03/20/2022 11:50 AM    PLATELET 461 47/63/8522 11:50 AM    MCV 87.0 03/20/2022 11:50 AM         ASA 3  Airway2      Plan:  The risk, benefits, expected outcome, and alternative to the recommended procedure have been discussed with the patient. Patient understands and wants to proceed with the procedure.     Electronically signed by Lissette Aburto DO on 4/6/2022 at 7:59 AM

## 2022-04-06 NOTE — PROGRESS NOTES
1150 - TRANSFER - IN REPORT:    Verbal report received from Balbina Morrissey (name) on [facility].  being received from Cath lab PACU (unit) for routine post - op      Report consisted of patients Situation, Background, Assessment and   Recommendations(SBAR). Information from the following report(s) SBAR, Kardex, ED Summary, OR Summary, Procedure Summary, Intake/Output, MAR, Recent Results, Med Rec Status and Alarm Parameters  was reviewed with the receiving nurse. Opportunity for questions and clarification was provided. Assessment completed upon patients arrival to unit and care assumed. Patient arrived to room 3011    Neuro - A&Ox 4  Cardio - NSR, regular heart sounds. R. Femoral catheter entry/exit site covered with gauze and transparent dressing - clean, dry, intact. No hematoma or bleeding. R. radial catheter entry/exit site - TR band in place, clean, dry, intact. No hematoma or bleeding. Resp - Room air. Lungs clear.  -   GI - Passed swallow screen, regular diet. Primary Nurse Mariangel Schroeder RN and Madie Don RN performed a dual skin assessment on this patient No impairment noted  Misael score is 18     Access - #22 left forearm  Rate verified Integrilin drip. 1330 - Gave PRN Tylenol for headache.   1332 - Right radial TR band removed, placed quick clot and tegaderm. 1600 - Reassessment completed, no needs at this time. 1900 - Bedside shift change report given to Hung (oncoming nurse) by Brendan Retana (offgoing nurse). Report included the following information SBAR, Kardex, ED Summary, Intake/Output, MAR, Recent Results, Med Rec Status, Cardiac Rhythm 1st degree AV block and Alarm Parameters .

## 2022-04-06 NOTE — ROUTINE PROCESS
7:25 AM  Patient arrived. ID and allergies verified verbally with patient. Pt voices understanding of procedure to be performed. Consent obtained. Pt prepped for procedure. 11:03 AM  TRANSFER - IN REPORT:    Verbal report received from jasmin connolly.(name) on Christopher Andujar.  being received from cath lab(unit) for routine progression of care      Report consisted of patients Situation, Background, Assessment and   Recommendations(SBAR). Information from the following report(s) Procedure Summary was reviewed with the receiving nurse. Opportunity for questions and clarification was provided. Assessment completed upon patients arrival to unit and care assumed. 1140  Report called to JASMIN glover. Detailed procedure summary reviewed and patient will be going to room 11.    1150  Bedside report done as well with Gala Sawyer RN. Transferred patient to new room. PCI patient meets criteria for outpatient cardiac rehab. [facility] outpatient cardiac rehab referral will be initiated. Educational handouts provided on: PCI procedure, coronary artery disease, coronary artery risk factors, heart healthy eating, and community resources. Reference information on [address] Outpatient Cardiac Rehab Program also provided.  [facility] cardiac rehab staff will contact patent, per telephone call, to assess and schedule program participation

## 2022-04-06 NOTE — Clinical Note
Pain assessed by Mika Weller and continually monitored by the circulating RN and documented in [facility]

## 2022-04-06 NOTE — PROCEDURES
BRIEF PROCEDURE NOTE    Date of Procedure: 4/6/2022   Preoperative Diagnosis: Unstable angina  Postoperative Diagnosis: Coronary artery disease  Procedure: Left heart cath, coronary angiography  Interventional Cardiologist: Adrianna Pride DO  Assistant: None  Anesthesia: local + IV moderate sedation   I administered moderate sedation throughout this procedure. An independent trained observer pushed medications at my direction, and monitored the patients level of consciousness and physiological status throughout. Estimated Blood Loss: Minimal    Access: Right radial artery, 6F. High origin of radial, unable to pass catheter safely. Right common femoral artery, 6 Western Tresa upsized to 7 Western Tresa. Catheters:  Left coronary: JL 3.5, 5F  Right coronary: JR4, 5F    Findings:   L Main: Large caliber vessel, mild distal left main disease  LAD: Large caliber vessel, severe 99% stenosis with significant plaque burden from proximal LAD into mid LAD involving the origin of D2, moderate mid LAD disease with mild apical disease, D1 small caliber vessel with mild disease, D2 moderate caliber with slow flow related to stenosis, RENU I flow into distal LAD  LCx: Large caliber vessel, moderate OM1 with an ostial 50 to 60% stenosis, moderate OM 2 with mild disease, small OM 3 with mild disease  RCA: Moderate caliber vessel, small PDA with diffuse mild disease    LVEDP:  < 10 mmhg    PCI:  LAD PCI  6 [place] upsized to 7 Western Tresa femoral.  EBU 3.5, 7 Western Tresa guide  Systemic heparin    Marco blue passed into distal LAD. Patient developed ST elevation after wiring the vessel which resolved after angioplasty. Dilated the vessel with 3.0 compliant balloon. IVUS used for vessel sizing. There was normalization of flow into D2. Proximal into mid LAD was stented with a 3.0X 28 mm Xience [address] PATRIA deployed at 12 wilmer. Vessel was postdilated with a 3.0 and 3.5 noncompliant balloon at 16 to 20 wilmer.   Wire position and guide placement was lost after post dilation. JL 3.5, 5 Western Tresa Convey was used to reengage vessel to perform final pictures. Vessel was wired and IVUS performed. Subsequent pictures acute stent thrombosis of proximal LAD with thrombus burden in D2. ACT at that time was 190, with previous ACT being 330. Vessel was reengaged with EBU 3.5, 6 Western Tresa guide. Intracoronary Integrilin was administered. LAD and D2 was wired. Attempted to pass a 2 oh and subsequently 1.2 compliant balloons into D2. Unable to pass balloon into D2. Second Integrilin bolus was administered. Proximal aspect of the stent was postdilated with a 4.0 noncompliant balloon. Final IVUS was performed. Plaque burden at distal stent approximately 30% without evidence of dissection or perforation. Mild plaque on IVUS proximal to previous stent placement with MLA of 8.8 mm². RENU-3 flow into D2 and LAD post procedure. No dissection or perforation. Specimens Removed: None    Implants: Xience Odette PATRIA    Closure Device: radial TR band, perclose    See full cath note. Complications: none      Findings:  1. Severe 99% stenosis within LAD with heavy plaque burden with RENU I flow into distal LAD  2. LAD stenting with 3.0 x 28 mm Xience Odette PATRIA. Stenting complicated by acute stent thrombosis that was successfully treated with intracoronary Integrilin and further stent optimization. Distal aspect of the stent was dilated with 3.0, mid aspect with 3.5 and proximal with 4.0 NC balloon at high pressure  3. Moderate disease of branch vessels in the circumflex  4. Normal LVEDP    Plan:    Dual antiplatelet therapy with Brilinta for at least 6 months    We will admit for observation Integrilin drip x18 hours.     DO Juany Jimenes,   [facility] [id number] [facility], [address] [place] [address], [address] [place] [address] [place]                                              Office [phone],W [phone]

## 2022-04-06 NOTE — PROGRESS NOTES
Reason for Admission:  Unstable angina                     RUR Score:          observation status           Plan for utilizing home health: There are no current home health  Issues      PCP: First and Last name:  Luisito Angelo MD     Name of Practice:    Are you a current patient: Yes/No: yes   Approximate date of last visit:    Can you participate in a virtual visit with your PCP: yes                    Current Advanced Directive/Advance Care Plan: Full Code      Healthcare Decision Maker:            Isabela Foster @ [phone]                    Transition of Care Plan:                    I met with pt to discuss future discharge needs. Pt lives with his wife @ the address on demographics. 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 In pricing out the cost of brilinta with pt.'s pharmacy,the cost of the brilinta will be 447 $ as pt does not have medicare part D.    Pt states he has only been on lisinopril and a statin so has been using the good rx discount card. Pt states he will need to invest in medicare part D . However,that will take awhile to become activated. I discussed this issue with cardiology NP. Pt will likely  return to the cath lab in the next day or two to assess the viability of this stent,etc due to stent complication (acute stent thrombosis) successfully treated with integrellin. Per cardiology ,the plan is for pt to remain on the brilinta until the second cath and then be switched to plavix . Melodie Parra Serve    Medicare Outpatient Observation Notice (MOON)/ [place] Outpatient Observation Notice (Gio Red) provided to patient/representative with verbal explanation of the notice. Time allotted for questions regarding the notice. Patient /representative provided a completed copy of the MOON/VOON notice.   Copy placed on bedside chart.(signed electronically)    Melodie Mejía

## 2022-04-06 NOTE — ADVANCED PRACTICE NURSE
Pt examined upon admission to ICU  No chest pain  Visit Vitals  /70   Pulse 66   Temp 98.1 °F (36.7 °C)   Resp 13   Ht 5' 8\" (1.727 m)   Wt 90 kg (198 lb 8 oz)   SpO2 95%   BMI 30.18 kg/m²     C/o mild tenderness R radial cath site    Eating lunch

## 2022-04-06 NOTE — Clinical Note
TRANSFER - IN REPORT:     Verbal report received from: Max 47-7. Report consisted of patient's Situation, Background, Assessment and   Recommendations(SBAR). Opportunity for questions and clarification was provided. Assessment completed upon patient's arrival to unit and care assumed. Patient transported with a Registered Nurse.

## 2022-04-06 NOTE — Clinical Note
TRANSFER - IN REPORT:     Verbal report received from: Christiana Gabriel. Report consisted of patient's Situation, Background, Assessment and   Recommendations(SBAR). Opportunity for questions and clarification was provided. Assessment completed upon patient's arrival to unit and care assumed. Patient transported with a Registered Nurse and [address] [place] [address] / [facility] [place].

## 2022-04-07 ENCOUNTER — APPOINTMENT (OUTPATIENT)
Dept: NON INVASIVE DIAGNOSTICS | Age: 72
DRG: 247 | End: 2022-04-07
Attending: NURSE PRACTITIONER
Payer: MEDICARE

## 2022-04-07 VITALS
TEMPERATURE: 97.9 F | HEIGHT: 68 IN | RESPIRATION RATE: 13 BRPM | SYSTOLIC BLOOD PRESSURE: 113 MMHG | BODY MASS INDEX: 29.6 KG/M2 | WEIGHT: 195.33 LBS | DIASTOLIC BLOOD PRESSURE: 76 MMHG | OXYGEN SATURATION: 93 % | HEART RATE: 69 BPM

## 2022-04-07 LAB
ALBUMIN SERPL-MCNC: 3.3 G/DL (ref 3.5–5)
ALBUMIN/GLOB SERPL: 1.2 {RATIO} (ref 1.1–2.2)
ALP SERPL-CCNC: 51 U/L (ref 45–117)
ALT SERPL-CCNC: 26 U/L (ref 12–78)
ANION GAP SERPL CALC-SCNC: 4 MMOL/L (ref 5–15)
AST SERPL-CCNC: 20 U/L (ref 15–37)
BILIRUB SERPL-MCNC: 0.4 MG/DL (ref 0.2–1)
BUN SERPL-MCNC: 19 MG/DL (ref 6–20)
BUN/CREAT SERPL: 16 (ref 12–20)
CALCIUM SERPL-MCNC: 8.6 MG/DL (ref 8.5–10.1)
CHLORIDE SERPL-SCNC: 110 MMOL/L (ref 97–108)
CHOLEST SERPL-MCNC: 120 MG/DL
CO2 SERPL-SCNC: 27 MMOL/L (ref 21–32)
CREAT SERPL-MCNC: 1.2 MG/DL (ref 0.7–1.3)
ERYTHROCYTE [DISTWIDTH] IN BLOOD BY AUTOMATED COUNT: 13.4 % (ref 11.5–14.5)
GLOBULIN SER CALC-MCNC: 2.8 G/DL (ref 2–4)
GLUCOSE SERPL-MCNC: 108 MG/DL (ref 65–100)
HCT VFR BLD AUTO: 41.8 % (ref 36.6–50.3)
HDLC SERPL-MCNC: 36 MG/DL
HDLC SERPL: 3.3 {RATIO} (ref 0–5)
HGB BLD-MCNC: 14.3 G/DL (ref 12.1–17)
LDLC SERPL CALC-MCNC: 53.6 MG/DL (ref 0–100)
MCH RBC QN AUTO: 29.8 PG (ref 26–34)
MCHC RBC AUTO-ENTMCNC: 34.2 G/DL (ref 30–36.5)
MCV RBC AUTO: 87.1 FL (ref 80–99)
NRBC # BLD: 0 K/UL (ref 0–0.01)
NRBC BLD-RTO: 0 PER 100 WBC
PLATELET # BLD AUTO: 189 K/UL (ref 150–400)
PMV BLD AUTO: 9.3 FL (ref 8.9–12.9)
POTASSIUM SERPL-SCNC: 4 MMOL/L (ref 3.5–5.1)
PROT SERPL-MCNC: 6.1 G/DL (ref 6.4–8.2)
RBC # BLD AUTO: 4.8 M/UL (ref 4.1–5.7)
SODIUM SERPL-SCNC: 141 MMOL/L (ref 136–145)
TRIGL SERPL-MCNC: 152 MG/DL (ref ?–150)
VLDLC SERPL CALC-MCNC: 30.4 MG/DL
WBC # BLD AUTO: 9.5 K/UL (ref 4.1–11.1)

## 2022-04-07 PROCEDURE — C1894 INTRO/SHEATH, NON-LASER: HCPCS | Performed by: STUDENT IN AN ORGANIZED HEALTH CARE EDUCATION/TRAINING PROGRAM

## 2022-04-07 PROCEDURE — B2111ZZ FLUOROSCOPY OF MULTIPLE CORONARY ARTERIES USING LOW OSMOLAR CONTRAST: ICD-10-PCS | Performed by: STUDENT IN AN ORGANIZED HEALTH CARE EDUCATION/TRAINING PROGRAM

## 2022-04-07 PROCEDURE — APPSS30 APP SPLIT SHARED TIME 16-30 MINUTES: Performed by: NURSE PRACTITIONER

## 2022-04-07 PROCEDURE — 83695 ASSAY OF LIPOPROTEIN(A): CPT

## 2022-04-07 PROCEDURE — G0378 HOSPITAL OBSERVATION PER HR: HCPCS

## 2022-04-07 PROCEDURE — 80053 COMPREHEN METABOLIC PANEL: CPT

## 2022-04-07 PROCEDURE — 74011000636 HC RX REV CODE- 636: Performed by: STUDENT IN AN ORGANIZED HEALTH CARE EDUCATION/TRAINING PROGRAM

## 2022-04-07 PROCEDURE — 74011250636 HC RX REV CODE- 250/636: Performed by: STUDENT IN AN ORGANIZED HEALTH CARE EDUCATION/TRAINING PROGRAM

## 2022-04-07 PROCEDURE — 77030008543 HC TBNG MON PRSS MRTM -A: Performed by: STUDENT IN AN ORGANIZED HEALTH CARE EDUCATION/TRAINING PROGRAM

## 2022-04-07 PROCEDURE — 80061 LIPID PANEL: CPT

## 2022-04-07 PROCEDURE — 77030019569 HC BND COMPR RAD TERU -B: Performed by: STUDENT IN AN ORGANIZED HEALTH CARE EDUCATION/TRAINING PROGRAM

## 2022-04-07 PROCEDURE — 4A023N7 MEASUREMENT OF CARDIAC SAMPLING AND PRESSURE, LEFT HEART, PERCUTANEOUS APPROACH: ICD-10-PCS | Performed by: STUDENT IN AN ORGANIZED HEALTH CARE EDUCATION/TRAINING PROGRAM

## 2022-04-07 PROCEDURE — B2151ZZ FLUOROSCOPY OF LEFT HEART USING LOW OSMOLAR CONTRAST: ICD-10-PCS | Performed by: STUDENT IN AN ORGANIZED HEALTH CARE EDUCATION/TRAINING PROGRAM

## 2022-04-07 PROCEDURE — 77030018729 HC ELECTRD DEFIB PAD CARD -B: Performed by: STUDENT IN AN ORGANIZED HEALTH CARE EDUCATION/TRAINING PROGRAM

## 2022-04-07 PROCEDURE — 36415 COLL VENOUS BLD VENIPUNCTURE: CPT

## 2022-04-07 PROCEDURE — 74011000250 HC RX REV CODE- 250: Performed by: STUDENT IN AN ORGANIZED HEALTH CARE EDUCATION/TRAINING PROGRAM

## 2022-04-07 PROCEDURE — 85027 COMPLETE CBC AUTOMATED: CPT

## 2022-04-07 PROCEDURE — 74011250637 HC RX REV CODE- 250/637: Performed by: STUDENT IN AN ORGANIZED HEALTH CARE EDUCATION/TRAINING PROGRAM

## 2022-04-07 PROCEDURE — C1887 CATHETER, GUIDING: HCPCS | Performed by: STUDENT IN AN ORGANIZED HEALTH CARE EDUCATION/TRAINING PROGRAM

## 2022-04-07 PROCEDURE — 77030027138 HC INCENT SPIROMETER -A

## 2022-04-07 PROCEDURE — 2709999900 HC NON-CHARGEABLE SUPPLY: Performed by: STUDENT IN AN ORGANIZED HEALTH CARE EDUCATION/TRAINING PROGRAM

## 2022-04-07 PROCEDURE — 93454 CORONARY ARTERY ANGIO S&I: CPT | Performed by: STUDENT IN AN ORGANIZED HEALTH CARE EDUCATION/TRAINING PROGRAM

## 2022-04-07 PROCEDURE — 99152 MOD SED SAME PHYS/QHP 5/>YRS: CPT | Performed by: STUDENT IN AN ORGANIZED HEALTH CARE EDUCATION/TRAINING PROGRAM

## 2022-04-07 RX ORDER — MIDAZOLAM HYDROCHLORIDE 1 MG/ML
INJECTION, SOLUTION INTRAMUSCULAR; INTRAVENOUS AS NEEDED
Status: DISCONTINUED | OUTPATIENT
Start: 2022-04-07 | End: 2022-04-07 | Stop reason: HOSPADM

## 2022-04-07 RX ORDER — VERAPAMIL HYDROCHLORIDE 2.5 MG/ML
INJECTION, SOLUTION INTRAVENOUS AS NEEDED
Status: DISCONTINUED | OUTPATIENT
Start: 2022-04-07 | End: 2022-04-07 | Stop reason: HOSPADM

## 2022-04-07 RX ORDER — FENTANYL CITRATE 50 UG/ML
INJECTION, SOLUTION INTRAMUSCULAR; INTRAVENOUS AS NEEDED
Status: DISCONTINUED | OUTPATIENT
Start: 2022-04-07 | End: 2022-04-07 | Stop reason: HOSPADM

## 2022-04-07 RX ORDER — SODIUM CHLORIDE 0.9 % (FLUSH) 0.9 %
5-40 SYRINGE (ML) INJECTION AS NEEDED
Status: DISCONTINUED | OUTPATIENT
Start: 2022-04-07 | End: 2022-04-07 | Stop reason: HOSPADM

## 2022-04-07 RX ORDER — SODIUM CHLORIDE 0.9 % (FLUSH) 0.9 %
5-40 SYRINGE (ML) INJECTION EVERY 8 HOURS
Status: DISCONTINUED | OUTPATIENT
Start: 2022-04-07 | End: 2022-04-07 | Stop reason: HOSPADM

## 2022-04-07 RX ORDER — LIDOCAINE HYDROCHLORIDE 10 MG/ML
INJECTION INFILTRATION; PERINEURAL AS NEEDED
Status: DISCONTINUED | OUTPATIENT
Start: 2022-04-07 | End: 2022-04-07 | Stop reason: HOSPADM

## 2022-04-07 RX ORDER — HEPARIN SODIUM 200 [USP'U]/100ML
INJECTION, SOLUTION INTRAVENOUS
Status: COMPLETED | OUTPATIENT
Start: 2022-04-07 | End: 2022-04-07

## 2022-04-07 RX ORDER — HEPARIN SODIUM 1000 [USP'U]/ML
INJECTION, SOLUTION INTRAVENOUS; SUBCUTANEOUS AS NEEDED
Status: DISCONTINUED | OUTPATIENT
Start: 2022-04-07 | End: 2022-04-07 | Stop reason: HOSPADM

## 2022-04-07 RX ADMIN — EPTIFIBATIDE 2 MCG/KG/MIN: 0.75 INJECTION INTRAVENOUS at 04:05

## 2022-04-07 RX ADMIN — ACETAMINOPHEN 650 MG: 325 TABLET ORAL at 01:20

## 2022-04-07 RX ADMIN — LISINOPRIL 10 MG: 5 TABLET ORAL at 08:22

## 2022-04-07 RX ADMIN — SODIUM CHLORIDE, PRESERVATIVE FREE 10 ML: 5 INJECTION INTRAVENOUS at 13:56

## 2022-04-07 RX ADMIN — ASPIRIN 81 MG: 81 TABLET, COATED ORAL at 08:22

## 2022-04-07 RX ADMIN — SODIUM CHLORIDE, PRESERVATIVE FREE 10 ML: 5 INJECTION INTRAVENOUS at 13:44

## 2022-04-07 RX ADMIN — SODIUM CHLORIDE, PRESERVATIVE FREE 10 ML: 5 INJECTION INTRAVENOUS at 06:00

## 2022-04-07 RX ADMIN — SODIUM CHLORIDE, PRESERVATIVE FREE 10 ML: 5 INJECTION INTRAVENOUS at 01:32

## 2022-04-07 RX ADMIN — TICAGRELOR 90 MG: 90 TABLET ORAL at 10:08

## 2022-04-07 RX ADMIN — ACETAMINOPHEN 650 MG: 325 TABLET ORAL at 12:51

## 2022-04-07 NOTE — PROGRESS NOTES
CARDIOLOGY DISCHARGE SUMMARY        [address], [place], [address] [facility]  Phone [phone]; Fax [phone]          2022 8:01 AM       Admit Date:           2022  Admit Diagnosis:  Chest pain [R07.9]  CAD (coronary artery disease) [I25.10]  :          1950   MRN:          [id number]        Assessment/Plan  CAD: Severe 99% stenosis within LAD with heavy plaque burden. - S/P PATRIA to LAD> complicated by acute stent thrombosis that was successfully treated with intracoronary Integrilin and further stent optimization.    - Repeat cath today  with patent LAD stent   - DAPT asa/brilinta. Will plan to change to plavix as OP. (pt has no insurance for meds). - Cont high potency statin, ace- I.   - ECHO OP   - office provided brilinta samples to pt  Follow-up Information     Follow up With Specialties Details Why Contact Info    Shemar Quintana MD Cardiology, [address] [place] [address] Vascular Surgery On 2022 1 pm [address], [address] [place] [address]. Roland Alexander 134  [phone]                                 We discussed the expected course, resolution and complications of the diagnosis(es) in detail. 701 - 1900  In: -   Out: 350 [Urine:350]    Last 3 Recorded Weights in this Encounter    22 0822 0120   Weight: 90 kg (198 lb 8 oz) 88.6 kg (195 lb 5.2 oz)         1901 -  07  In: 830.2 [I.V.:830.2]  Out: 1700 [Urine:1700]    SUBJECTIVE      Recent stress test in office demonstrated medium sized area of moderate intensity ischemia of the anteroapical wall indicating significant disease in the mid LAD. Planned admit for cath               [facility] [employer]. reports none.       Current Facility-Administered Medications   Medication Dose Route Frequency    sodium chloride (NS) flush 5-40 mL  5-40 mL IntraVENous Q8H    sodium chloride (NS) flush 5-40 mL  5-40 mL IntraVENous PRN    ticagrelor (BRILINTA) tablet 90 mg  90 mg Oral Q12H    aspirin delayed-release tablet 81 mg  81 mg Oral DAILY    lisinopriL (PRINIVIL, ZESTRIL) tablet 10 mg  10 mg Oral DAILY    rosuvastatin (CRESTOR) tablet 40 mg  40 mg Oral QHS    sodium chloride (NS) flush 5-40 mL  5-40 mL IntraVENous Q8H    sodium chloride (NS) flush 5-40 mL  5-40 mL IntraVENous PRN    acetaminophen (TYLENOL) tablet 650 mg  650 mg Oral Q6H PRN    Or    acetaminophen (TYLENOL) suppository 650 mg  650 mg Rectal Q6H PRN    polyethylene glycol (MIRALAX) packet 17 g  17 g Oral DAILY PRN    ondansetron (ZOFRAN ODT) tablet 4 mg  4 mg Oral Q8H PRN    Or    ondansetron (ZOFRAN) injection 4 mg  4 mg IntraVENous Q6H PRN      OBJECTIVE               Intake/Output Summary (Last 24 hours) at 4/7/2022 0801  Last data filed at 4/7/2022 0730  Gross per 24 hour   Intake 830.22 ml   Output 2050 ml   Net -1219.78 ml       Review of Systems - History obtained from the patient AS PER  HPI        PHYSICAL EXAM        Visit Vitals  BP (!) 140/126 (BP 1 Location: Right upper arm, BP Patient Position: At rest)   Pulse 67   Temp 98.5 °F (36.9 °C)   Resp 16   Ht 5' 8\" (1.727 m)   Wt 88.6 kg (195 lb 5.2 oz)   SpO2 95%   BMI 29.70 kg/m²       Gen: Well-developed, well-nourished, in no acute distress  alert and oriented x 3  HEENT:  Pink conjunctivae, Hearing grossly normal.No scleral icterus or conjunctival, moist mucous membranes  Neck: Supple,No JVD   Resp: No accessory muscle use, Clear breath sounds, No rales or rhonchi  Card: Regular Rate,Rythm,Normal S1, S2, No murmurs, rubs or gallop. MSK: No cyanosis or clubbing, good capillary refill  Skin: R groin cath site D. I No rashes or ulcers, no bruising  Neuro:  Moving all four extremities, no focal deficit, follows commands appropriately  Psych:  Good insight, oriented to person, place and time, alert, Nml Affect  LE: No edema       DATA REVIEW Cardiac monitor: SR         Laboratory and Imaging have been reviewed by me and are notable for  No results for input(s): CPK, CKMB, TROIQ in the last 72 hours.   Recent Labs     04/07/22  0117      K 4.0   CO2 27   BUN 19   CREA 1.20   *   WBC 9.5   HGB 14.3   HCT 41.8

## 2022-04-07 NOTE — PROCEDURES
BRIEF PROCEDURE NOTE    Date of Procedure: 4/7/2022   Preoperative Diagnosis: intracoronary thrombus  Postoperative Diagnosis: Coronary artery disease  Procedure: Left heart cath, coronary angiography  Interventional Cardiologist: Adenike Garcia DO  Assistant: None  Anesthesia: local + IV moderate sedation   I administered moderate sedation throughout this procedure. An independent trained observer pushed medications at my direction, and monitored the patients level of consciousness and physiological status throughout. Estimated Blood Loss: Minimal    Access: Right ulnar artery, 6F. Catheters:  Left coronary: JL 3.5, 5F    Findings:   L Main: Large caliber vessel, mild distal left main disease  LAD: Large caliber vessel, proximal into mid-LAD widely patent with alma delia 3 flow into apex and D2. Minimal hazy filling defect at level of S1 that may represent small amount of residual thrombus  LCx: Large caliber vessel, moderate OM1 with an ostial 50 to 60% stenosis, moderate OM 2 with mild disease, small OM 3 with mild disease        Specimens Removed: None    Implants: n/a    Closure Device: radial TR band    See full cath note. Complications: none      Findings:  1.   Patent lad stent with restoration of alma delia 3 flow into lad apex and D2 after integrelin infusion    Plan:    Dual antiplatelet therapy with Brilinta for one month, then change to plavix due to cost    Adenike Garcia, [address],   [facility] [address] [place] [address], [address] [place] [address]                                              Office [phone],[facility] [phone]

## 2022-04-07 NOTE — PROGRESS NOTES
..Bedside shift change report given to [address] (oncoming nurse) by Gabriel Wharton RN (offgoing nurse). Report included the following information SBAR, Kardex, Intake/Output, MAR, Recent Results and Cardiac Rhythm NSR.     2000- Assessment complete, see flowsheet. Groin site is clean and dry, no hematoma. 0000- Pt is now NPO  0400- Pt NPO maintained   0600- Integrilin has been discontinued as per order    . Caio Ramirez Bedside shift change report given to Regan Wolff RN (oncoming nurse) by JASMIN Persaud (offgoing nurse). Report included the following information SBAR, Kardex, Intake/Output, MAR, Recent Results, Med Rec Status and Cardiac Rhythm NSR.

## 2022-04-07 NOTE — PROGRESS NOTES
Discharge:    Pt has follow-up appointments with his PCP and cardiologist.  Pt has brilinta samples obtained from cardiologist's office. The cost of the brilinta will be 447 $ as pt does not have medicare part D so pt plans to enroll in medicare part D . Pt has PCI educational packet with his stent card. Pt has intake packet for cardiac rehab @ [address] [place] [address]. There are no home health,rehab or DME needs identified. From a case management perspective,pt is okay to be discharged.     Melodie Parra Serve

## 2022-04-07 NOTE — PROGRESS NOTES
0700- Bedside shift change report given to Laith Anastacio (oncoming nurse) by Alissa Dhaliwal (offgoing nurse). Report included the following information SBAR, Kardex, ED Summary, Intake/Output, MAR, Recent Results, Cardiac Rhythm NSR and Alarm Parameters . 0730- Shift assessment complete, see doc flowsheets. Pt stated he is not in any pain. Pt informed by writer that a cath procedure is planned for 4:30 this afternoon. Daughter at bedside. Bed in lowest position and bed alarm on. Call bell in place. Pt informed to not get up without assistance. Incentive spirometer provided to pt and demonstration completed. Artemio Marie RN.    6384- Writer spoke with Ned WEBB regarding cath this afternoon. Okay for patient to have some clear liquids this AM. All AM meds can be administered. Artemio Marie RN.     1248- Pt returned to unit. 1203- Reassessment complete, pt in bed resting quietly. Pt on room air, 0/10 pain. 1251- Pt asked for tylenol. Arm where insertion site was today during procedure was bothering patient. Stated \"the band on my arm is too tight, it feels like my hand is numb\". TR band was checked by writer, no bleeding or hematoma noted. See MAR.     3019- Air removed from TR band per protocol following the guidelines and instructions. Artemio Marie RN. 1400- TR band completely off at this time. Gauze and tape applied. Plan is for discharge at [facility] 38 order says \"d/c in 3 hours\". Patient arrived to unit at 1201pm. Artemio Marie RN. 1500- Discharge instructions provided to patient with daughter present. Time for questions provided. Artemio Marie RN. 150- Pt wheeled off unit. Artemio Marie RN.

## 2022-04-07 NOTE — PROGRESS NOTES
10:36 AM  Patient brought down from ICU to [facility] in preparation for cath. Will continue to monitor patient.

## 2022-04-07 NOTE — DISCHARGE INSTRUCTIONS
Patient Discharge Instructions     Obtain Brilinta samples from office     Cardiac Catheterization  Discharge Instructions     Do not drive, operate any machinery, or sign any legal documents for 24 hours after your procedure. You must have someone to drive you home.  You may take a shower 24 hours after your cardiac catheterization. Be sure to get the dressing wet and then remove it; gently wash the area with warm soapy water. Pat dry and leave open to air. To help prevent infections, be sure to keep the cath site clean and dry. No lotions, creams, powders, ointments, etc. in the cath site for approximately 1 week.  Do not take a tub bath, get in a hot tub or swimming pool for approximately 5 days or until the cath site is completely healed.  No strenuous activity or heavy lifting over 10 lbs. for 7 days.  Drink plenty of fluids for 24-48 hours after your cath to flush the contrast dye from your kidneys. No alcoholic beverages for 24 hours. You may resume your previous diet (low fat, low cholesterol) after your cath.  After your cath, some bruising or discomfort is common during the healing process. Tylenol, 1-2 tablets every 6 hours as needed, is recommended if you experience any discomfort. If you experience any signs or symptoms of infection such as fever, chills, or poorly healing incision, persistent tenderness or swelling in the groin, redness and/or warmth to the touch, numbness, significant tingling or pain at the groin site or affected extremity, rash, drainage from the cath site, or if the leg feels tight or swollen, call your physician right away.  If bleeding at the cath site occurs, take a clean gauze pad and apply direct pressure to the groin just above the puncture site. Call 911 immediately, and continue to apply direct pressure until an ambulance gets to your location.  You may return to work  2  days after your cardiac cath if no groin bleeding. Information obtained by :  I understand that if any problems occur once I am at home I am to contact my physician. I understand and acknowledge receipt of the instructions indicated above. R.N.'s Signature                                                                  Date/Time                                                                                                                                              Patient or Representative Signature                                                          Date/Time      Radial Cardiac Catheterization/Angiography Discharge Instructions   It is normal to feel tired the first couple days. Take it easy and follow the physicians instructions. CHECK THE CATHETER INSERTION SITE DAILY:   Remove the wrist dressing 24 hours after the procedure. You may shower 24 hours after the procedure. Wash with soap and water and pat dry. Gentle cleaning of the site with soap and water is sufficient, cover with a dry clean dressing or bandage. Do not apply creams or powders to the area. No soaking the wrist for 3 days. Leave the puncture site open to air after 24 hours post-procedure. CALL THE PHYSICIANS:   If the site becomes red, swollen or feels warm to the touch   If there is bleeding or drainage or if there is unusual pain at the radial site. If there is any minor oozing, you may apply a band-aid and remove after 12 hours. If the bleeding continues, hold pressure with the middle finger against the puncture site and the thumb against the back of the wrist,call 911 to be transported to the hospital.   DO NOT DRIVE YOURSELF, [facility] 858.    ACTIVITY:   For the first 24 hours do not manipulate the wrist.   No lifting, pushing or pulling over 3-5 pounds with the affected wrist for 7 daysand no straining the insertion site. Do not life grocery bags or the garbage can, do not run the vacuum  or  for 7 days. Start with short walks as in the hospital and gradually increase as tolerated each day. It is recommended to walk 30 minutes 5-7 days per week. Follow your physicians instructions on activity. Avoid walking outside in extremes of heat or cold. Walk inside when it is cold and windy or hot and humid. Things to keep in mind:   No driving for at least 24 hours, or as designated by your physician. Limit the number of times you go up and down the stairs   Take rests and pace yourself with activity. Be careful and do not strain with bowel movements. MEDICATIONS:   Take all medications as prescribed   Call your physician if you have any questions   Keep an updated list of your medications with you at all times and give a list to your physician and pharmacist   SIGNS AND SYMPTOMS:   Be cautious of symptoms of angina or recurrent symptoms such as chest discomfort, unusual shortness of breath or fatigue. These could be symptoms of restenosis, a new blockage or a heart attack. If your symptoms are relieved with rest it is still recommended that you notify your physician of recurrent chest pain or discomfort. For CHEST PAIN or symptoms of angina not relieved with rest: If the discomfort is not relieved with rest, and you have been prescribed Nitroglycerin, take as directed (taken under the tongue, one at a time 5 minutes apart for a total of 3 doses). If the discomfort is not relieved after the 3rd nitroglycerin, call 911. If you have not been prescribed Nitroglycerin and your chest discomfort is not relieved with rest, call 911. AFTER CARE:   Follow up with your physician as instructed. Follow a heart healthy diet with proper portion control, daily stress management, daily exercise, blood pressure and cholesterol control , and smoking cessation.

## 2022-04-08 LAB — LPA SERPL-SCNC: 99.4 NMOL/L

## 2022-04-10 LAB
ATRIAL RATE: 67 BPM
CALCULATED P AXIS, ECG09: 66 DEGREES
CALCULATED R AXIS, ECG10: 69 DEGREES
CALCULATED T AXIS, ECG11: 77 DEGREES
DIAGNOSIS, 93000: NORMAL
P-R INTERVAL, ECG05: 178 MS
Q-T INTERVAL, ECG07: 388 MS
QRS DURATION, ECG06: 84 MS
QTC CALCULATION (BEZET), ECG08: 409 MS
VENTRICULAR RATE, ECG03: 67 BPM

## 2022-04-12 ENCOUNTER — TRANSCRIBE ORDER (OUTPATIENT)
Dept: CARDIAC REHAB | Age: 72
End: 2022-04-12

## 2022-04-12 DIAGNOSIS — Z95.5 STENTED CORONARY ARTERY: Primary | ICD-10-CM

## 2022-04-20 ENCOUNTER — HOSPITAL ENCOUNTER (OUTPATIENT)
Dept: CARDIAC REHAB | Age: 72
Discharge: HOME | End: 2022-04-20
Payer: MEDICARE

## 2022-04-20 VITALS — WEIGHT: 203.8 LBS | BODY MASS INDEX: 30.89 KG/M2 | HEIGHT: 68 IN

## 2022-04-20 PROCEDURE — 93798 PHYS/QHP OP CAR RHAB W/ECG: CPT

## 2022-04-20 NOTE — CARDIO/PULMONARY
[facility] Cardiopulmonary Rehab Orientation:  Met with Brandie Garcia" Katelin Waldens, for cardiac rehab orientation and exercise tolerance test today. Mr. Munira Champagne is a 73year-old patient of Dr Dawit Long, S/P PCI/PATRIA prox -mid LAD with 3.0 x 28mm-prox vessel post dilated to 4.0 mm, mid stent to 3.5 (4/6/2022). Pt also received IC Integrilin post procedure due to acute closure/clot. Repeat procedure next day revealed clear vessel. No previous cardiac history. LVEF 58% by nuclear stress testing. Cardiac risk factors include: HTN, HLD, obesity,age, gender and family hx. Of note, pt's Lp(a) was 99.5 upon assessment 4/7/2022 and pt also gives a history of periodontal disease. BMI 31. Pt has never smoked cigarettes. CAD risk factors were reviewed with patient. Pt resides with his wife in [place]. He works as a  and is self employed. He has raised 3 children, who live locally and are supportive. Depression score PHQ9 is 0 and this is considered a low score. The result was discussed with patient, who affirms score to be accurate. Pt reported no issues with stress. Patient denied chest pain or SOB during 6 minute exercise tolerance test on treadmill at 2.6 mph, with peak , peak /92, and RPE 11. Cardiac rhythm was SR/ST with rare PVCs. Limitations to exercise are primarily related to mild deconditioning & reluctance to exercise post procedure. Pt has been walking at home. He enjoys walking outdoors with his dog, yard work and travel. Pt was given the Cardiac Rehab manual and an exercise plan was developed. Pt will attend cardiac rehab 2-3 times per week. Pt verbalized plan to engage in home exercise of walking.

## 2022-04-22 ENCOUNTER — OFFICE VISIT (OUTPATIENT)
Dept: CARDIOLOGY CLINIC | Age: 72
End: 2022-04-22
Payer: MEDICARE

## 2022-04-22 VITALS
DIASTOLIC BLOOD PRESSURE: 94 MMHG | SYSTOLIC BLOOD PRESSURE: 138 MMHG | HEIGHT: 68 IN | OXYGEN SATURATION: 97 % | BODY MASS INDEX: 30.46 KG/M2 | HEART RATE: 71 BPM | WEIGHT: 201 LBS

## 2022-04-22 DIAGNOSIS — I20.8 STABLE ANGINA PECTORIS (HCC): Primary | ICD-10-CM

## 2022-04-22 DIAGNOSIS — R07.9 CHEST PAIN, UNSPECIFIED TYPE: ICD-10-CM

## 2022-04-22 PROCEDURE — G8432 DEP SCR NOT DOC, RNG: HCPCS | Performed by: INTERNAL MEDICINE

## 2022-04-22 PROCEDURE — 1101F PT FALLS ASSESS-DOCD LE1/YR: CPT | Performed by: INTERNAL MEDICINE

## 2022-04-22 PROCEDURE — G8417 CALC BMI ABV UP PARAM F/U: HCPCS | Performed by: INTERNAL MEDICINE

## 2022-04-22 PROCEDURE — 99214 OFFICE O/P EST MOD 30 MIN: CPT | Performed by: INTERNAL MEDICINE

## 2022-04-22 PROCEDURE — G0463 HOSPITAL OUTPT CLINIC VISIT: HCPCS | Performed by: INTERNAL MEDICINE

## 2022-04-22 PROCEDURE — G8536 NO DOC ELDER MAL SCRN: HCPCS | Performed by: INTERNAL MEDICINE

## 2022-04-22 PROCEDURE — 3017F COLORECTAL CA SCREEN DOC REV: CPT | Performed by: INTERNAL MEDICINE

## 2022-04-22 PROCEDURE — G8428 CUR MEDS NOT DOCUMENT: HCPCS | Performed by: INTERNAL MEDICINE

## 2022-04-22 PROCEDURE — 1111F DSCHRG MED/CURRENT MED MERGE: CPT | Performed by: INTERNAL MEDICINE

## 2022-04-22 NOTE — PROGRESS NOTES
Isidoro Holm. is a 73 y.o. male    Visit Vitals  BP (!) 138/94 (BP 1 Location: Left upper arm, BP Patient Position: Sitting, BP Cuff Size: Adult)   Pulse 71   Ht 5' 8\" (1.727 m)   Wt 201 lb (91.2 kg)   SpO2 97%   BMI 30.56 kg/m²       Chief Complaint   Patient presents with    Chest Pain    Shortness of Breath    Cholesterol Problem       Chest pain NO  SOB NO  Dizziness NO  Swelling NO  Recent hospital visit [facility], [facility] [address]  HAD COVID?  YES

## 2022-04-22 NOTE — PROGRESS NOTES
Office Follow-up    NAME: Isidoro Holm. :  1950  MRM:  [id number]    Date:  2022         Assessment and Plan:     1. CAD (2022), LAD 99% status post PCI, left main mild distal left main disease, obtuse marginal ostial 50 to 60% stenosis, OM 2 mild disease, OM 3 mild disease, PDA mild diffuse disease: Continue medical management with aspirin, Brilinta and statins. Continue cardiac rehab.  2. Hypertension: Blood pressure is controlled. Continue lisinopril. 3. See Dr. Arlene Marion in 6 months. ATTENTION:   This medical record was transcribed using an electronic medical records/speech recognition system. Although proofread, it may and can contain electronic, spelling and other errors. Corrections may be executed at a later time. Please feel free to contact us for any clarifications as needed. Subjective:     Isidoro Holm., a 73y.o. year-old who presents for followup. He underwent left heart catheterization on 2022 and was found to have 99% mid LAD lesion. He underwent PCI with stenting. He is returning today for follow-up. Overall he feels okay without any significant chest pain.    ---------------    22:   HPI: Ibis Chapman is a 73 y.o. male with no significant past medical history is here for evaluation of symptoms of chest pain. Symptoms started few months ago and the first episode was in the 2021 when he had an episode of chest pain which lasted for few minutes it was moderate intensity however it resolved spontaneously and thereafter he did not have symptoms until recentlyOn  while he was walking his dog has symptoms of similar chest pain which improved upon resting. He had another bout of chest pain the following day while he was active and associated with the symptoms of chest pain he also felt some tingling or numbness sensation in both the arms.   Having a third episode of chest pain he came to the ER by which time his symptoms of chest pain had resolved. His EKG in the ER performed on March 20, 2022 was personally reviewed. EKG at that time did not demonstrate anything significant. His cardiac enzyme initially was troponin at 15 with a follow-up at 18. ER consultation with the ER physician at that time decision making was to discharge him from the ER and to follow-up with a stress test.  He is coming into our office today and had a stress test performed. Of note he does not have any previous cardiac history. He does not smoke tobacco.  His only risk factor from the family history standpoint as his maternal grandmother had CAD. Exam:     Physical Exam:  Visit Vitals  BP (!) 138/94 (BP 1 Location: Left upper arm, BP Patient Position: Sitting, BP Cuff Size: Adult)   Pulse 71   Ht 5' 8\" (1.727 m)   Wt 201 lb (91.2 kg)   SpO2 97%   BMI 30.56 kg/m²     General appearance - alert, well appearing, and in no distress  Mental status - affect appropriate to mood  Eyes - sclera anicteric, moist mucous membranes  Neck - supple, no significant adenopathy  Chest - clear to auscultation, no wheezes, rales or rhonchi  Heart - normal rate, regular rhythm, normal S1, S2, no murmurs, rubs, clicks or gallops  Abdomen - soft, nontender, nondistended, no masses or organomegaly  Extremities - peripheral pulses normal, no pedal edema  Skin - normal coloration  no rashes    Medications:     Current Outpatient Medications   Medication Sig    ticagrelor (BRILINTA) 90 mg tablet Take 1 Tablet by mouth every twelve (12) hours every twelve (12) hours.  acetaminophen (TylenoL) 325 mg tablet Take 500 mg by mouth every four (4) hours as needed for Pain.  lisinopriL (PRINIVIL, ZESTRIL) 10 mg tablet Take 10 mg by mouth daily.  aspirin delayed-release 81 mg tablet Take 1 Tablet by mouth daily.  rosuvastatin (CRESTOR) 20 mg tablet Take 1 Tablet by mouth nightly.  (Patient taking differently: Take 40 mg by mouth nightly.)    nitroglycerin (NITROSTAT) 0.4 mg SL tablet Dissolve one tablet under tongue as needed for chest pain. Wait 5 minutes, if no relief, take one more tablet, call 911. Can take up to 3 doses. No current facility-administered medications for this visit. Diagnostic Data Review:       No specialty comments available. Lab Review:     Lab Results   Component Value Date/Time    Cholesterol, total 120 04/07/2022 01:17 AM    HDL Cholesterol 36 04/07/2022 01:17 AM    LDL, calculated 53.6 04/07/2022 01:17 AM    Triglyceride 152 (H) 04/07/2022 01:17 AM    CHOL/HDL Ratio 3.3 04/07/2022 01:17 AM     Lab Results   Component Value Date/Time    Creatinine 1.20 04/07/2022 01:17 AM     Lab Results   Component Value Date/Time    BUN 19 04/07/2022 01:17 AM     Lab Results   Component Value Date/Time    Potassium 4.0 04/07/2022 01:17 AM     No results found for: HBA1C, HTT1SKWC  Lab Results   Component Value Date/Time    HGB 14.3 04/07/2022 01:17 AM     Lab Results   Component Value Date/Time    PLATELET 308 15/32/0203 01:17 AM     No results for input(s): CPK, CKMB, TROIQ in the last 72 hours. No lab exists for component: CKQMB, CPKMB             ___________________________________________________    Kimmy Shore MD, [facility]

## 2022-04-26 ENCOUNTER — HOSPITAL ENCOUNTER (OUTPATIENT)
Dept: CARDIAC REHAB | Age: 72
Discharge: HOME | End: 2022-04-26
Payer: MEDICARE

## 2022-04-26 VITALS — WEIGHT: 197.6 LBS | BODY MASS INDEX: 30.04 KG/M2

## 2022-04-26 PROCEDURE — 93798 PHYS/QHP OP CAR RHAB W/ECG: CPT

## 2022-04-28 ENCOUNTER — HOSPITAL ENCOUNTER (OUTPATIENT)
Dept: CARDIAC REHAB | Age: 72
Discharge: HOME | End: 2022-04-28
Payer: MEDICARE

## 2022-04-28 VITALS — WEIGHT: 200 LBS | BODY MASS INDEX: 30.41 KG/M2

## 2022-04-28 PROCEDURE — 93798 PHYS/QHP OP CAR RHAB W/ECG: CPT

## 2022-05-04 ENCOUNTER — HOSPITAL ENCOUNTER (OUTPATIENT)
Dept: CARDIAC REHAB | Age: 72
Discharge: HOME | End: 2022-05-04
Payer: MEDICARE

## 2022-05-04 VITALS — BODY MASS INDEX: 30.32 KG/M2 | WEIGHT: 199.4 LBS

## 2022-05-04 PROCEDURE — 93798 PHYS/QHP OP CAR RHAB W/ECG: CPT

## 2022-05-06 ENCOUNTER — APPOINTMENT (OUTPATIENT)
Dept: CARDIAC REHAB | Age: 72
End: 2022-05-06
Payer: MEDICARE

## 2022-05-11 ENCOUNTER — HOSPITAL ENCOUNTER (OUTPATIENT)
Dept: CARDIAC REHAB | Age: 72
Discharge: HOME | End: 2022-05-11
Payer: MEDICARE

## 2022-05-11 VITALS — BODY MASS INDEX: 30.35 KG/M2 | WEIGHT: 199.6 LBS

## 2022-05-11 PROCEDURE — 93798 PHYS/QHP OP CAR RHAB W/ECG: CPT

## 2022-05-13 ENCOUNTER — HOSPITAL ENCOUNTER (OUTPATIENT)
Dept: CARDIAC REHAB | Age: 72
Discharge: HOME | End: 2022-05-13
Payer: MEDICARE

## 2022-05-13 VITALS — WEIGHT: 198.6 LBS | BODY MASS INDEX: 30.2 KG/M2

## 2022-05-13 PROCEDURE — 93798 PHYS/QHP OP CAR RHAB W/ECG: CPT

## 2022-05-17 ENCOUNTER — HOSPITAL ENCOUNTER (OUTPATIENT)
Dept: CARDIAC REHAB | Age: 72
Discharge: HOME | End: 2022-05-17
Payer: MEDICARE

## 2022-05-17 VITALS — BODY MASS INDEX: 30.2 KG/M2 | WEIGHT: 198.6 LBS

## 2022-05-17 PROCEDURE — 93798 PHYS/QHP OP CAR RHAB W/ECG: CPT

## 2022-05-20 ENCOUNTER — APPOINTMENT (OUTPATIENT)
Dept: CARDIAC REHAB | Age: 72
End: 2022-05-20
Payer: MEDICARE

## 2022-05-25 ENCOUNTER — HOSPITAL ENCOUNTER (OUTPATIENT)
Dept: CARDIAC REHAB | Age: 72
Discharge: HOME | End: 2022-05-25
Payer: MEDICARE

## 2022-05-25 VITALS — BODY MASS INDEX: 29.77 KG/M2 | WEIGHT: 195.8 LBS

## 2022-05-25 PROCEDURE — 93798 PHYS/QHP OP CAR RHAB W/ECG: CPT

## 2022-05-27 ENCOUNTER — HOSPITAL ENCOUNTER (OUTPATIENT)
Dept: CARDIAC REHAB | Age: 72
Discharge: HOME | End: 2022-05-27
Payer: MEDICARE

## 2022-05-27 VITALS — WEIGHT: 194 LBS | BODY MASS INDEX: 29.5 KG/M2

## 2022-05-27 PROCEDURE — 93798 PHYS/QHP OP CAR RHAB W/ECG: CPT

## 2022-05-31 ENCOUNTER — HOSPITAL ENCOUNTER (OUTPATIENT)
Dept: CARDIAC REHAB | Age: 72
Discharge: HOME | End: 2022-05-31
Payer: MEDICARE

## 2022-05-31 VITALS — BODY MASS INDEX: 29.62 KG/M2 | WEIGHT: 194.8 LBS

## 2022-05-31 PROCEDURE — 93798 PHYS/QHP OP CAR RHAB W/ECG: CPT

## 2022-06-02 ENCOUNTER — HOSPITAL ENCOUNTER (OUTPATIENT)
Dept: CARDIAC REHAB | Age: 72
Discharge: HOME | End: 2022-06-02
Payer: MEDICARE

## 2022-06-02 VITALS — WEIGHT: 195 LBS | BODY MASS INDEX: 29.65 KG/M2

## 2022-06-02 PROCEDURE — 93798 PHYS/QHP OP CAR RHAB W/ECG: CPT

## 2022-06-08 ENCOUNTER — HOSPITAL ENCOUNTER (OUTPATIENT)
Dept: CARDIAC REHAB | Age: 72
Discharge: HOME | End: 2022-06-08
Payer: MEDICARE

## 2022-06-08 VITALS — WEIGHT: 192.4 LBS | BODY MASS INDEX: 29.25 KG/M2

## 2022-06-08 PROCEDURE — 93798 PHYS/QHP OP CAR RHAB W/ECG: CPT

## 2022-06-21 ENCOUNTER — HOSPITAL ENCOUNTER (OUTPATIENT)
Dept: CARDIAC REHAB | Age: 72
Discharge: HOME | End: 2022-06-21
Payer: MEDICARE

## 2022-06-21 VITALS — BODY MASS INDEX: 29.16 KG/M2 | WEIGHT: 191.8 LBS

## 2022-06-21 PROCEDURE — 93798 PHYS/QHP OP CAR RHAB W/ECG: CPT

## 2022-06-23 ENCOUNTER — HOSPITAL ENCOUNTER (OUTPATIENT)
Dept: CARDIAC REHAB | Age: 72
Discharge: HOME | End: 2022-06-23
Payer: MEDICARE

## 2022-06-23 VITALS — WEIGHT: 192.2 LBS | BODY MASS INDEX: 29.22 KG/M2

## 2022-06-23 PROCEDURE — 93798 PHYS/QHP OP CAR RHAB W/ECG: CPT

## 2022-06-30 ENCOUNTER — APPOINTMENT (OUTPATIENT)
Dept: CARDIAC REHAB | Age: 72
End: 2022-06-30
Payer: MEDICARE

## 2022-07-01 ENCOUNTER — HOSPITAL ENCOUNTER (OUTPATIENT)
Dept: CARDIAC REHAB | Age: 72
Discharge: HOME | End: 2022-07-01
Payer: MEDICARE

## 2022-07-01 VITALS — WEIGHT: 190.6 LBS | BODY MASS INDEX: 28.98 KG/M2

## 2022-07-01 PROCEDURE — 93798 PHYS/QHP OP CAR RHAB W/ECG: CPT

## 2022-07-13 ENCOUNTER — HOSPITAL ENCOUNTER (OUTPATIENT)
Dept: CARDIAC REHAB | Age: 72
Discharge: HOME | End: 2022-07-13
Payer: MEDICARE

## 2022-07-13 VITALS — WEIGHT: 190 LBS | BODY MASS INDEX: 28.89 KG/M2

## 2022-07-13 PROCEDURE — 93798 PHYS/QHP OP CAR RHAB W/ECG: CPT

## 2022-07-18 ENCOUNTER — HOSPITAL ENCOUNTER (OUTPATIENT)
Dept: CARDIAC REHAB | Age: 72
Discharge: HOME | End: 2022-07-18
Payer: MEDICARE

## 2022-07-18 VITALS — WEIGHT: 188.2 LBS | BODY MASS INDEX: 28.62 KG/M2

## 2022-07-18 PROCEDURE — 93798 PHYS/QHP OP CAR RHAB W/ECG: CPT

## 2022-07-18 PROCEDURE — 93797 PHYS/QHP OP CAR RHAB WO ECG: CPT | Performed by: DIETITIAN, REGISTERED

## 2022-07-18 NOTE — CARDIO/PULMONARY
Cardiac Rehab Nutrition Assessment - 1:1 Evaluation           NAME: Tao Aguilar : 1950 AGE: 73 y.o. GENDER: male  CARDIAC REHAB ADMITTING DIAGNOSIS: stent (22)    PROBLEM LIST:  Patient Active Problem List   Diagnosis Code    CAD (coronary artery disease) I25.10           LABS:   No results found for: HBA1C, DCK9CKVX, FIP7UIJR  Lab Results   Component Value Date/Time    Cholesterol, total 120 2022 01:17 AM    HDL Cholesterol 36 2022 01:17 AM    LDL, calculated 53.6 2022 01:17 AM    VLDL, calculated 30.4 2022 01:17 AM    Triglyceride 152 (H) 2022 01:17 AM    CHOL/HDL Ratio 3.3 2022 01:17 AM         MEDICATIONS/SUPPLEMENTS:   [unfilled]  Prior to Admission medications    Medication Sig Start Date End Date Taking? Authorizing Provider   ticagrelor (BRILINTA) 90 mg tablet Take 1 Tablet by mouth every twelve (12) hours every twelve (12) hours. 22   Paramjit Busch,    acetaminophen (TylenoL) 325 mg tablet Take 500 mg by mouth every four (4) hours as needed for Pain. Provider, Historical   lisinopriL (PRINIVIL, ZESTRIL) 10 mg tablet Take 10 mg by mouth daily. Provider, Historical   aspirin delayed-release 81 mg tablet Take 1 Tablet by mouth daily. 22   Chrystal Theodore MD   rosuvastatin (CRESTOR) 20 mg tablet Take 1 Tablet by mouth nightly. Patient taking differently: Take 40 mg by mouth nightly. 22   Chrystal Theodore MD   nitroglycerin (NITROSTAT) 0.4 mg SL tablet Dissolve one tablet under tongue as needed for chest pain. Wait 5 minutes, if no relief, take one more tablet, call 911. Can take up to 3 doses.  22   Chrystal Theodore MD           ANTHROPOMETRICS:    Ht Readings from Last 1 Encounters:   22 5' 8\" (1.727 m)      Wt Readings from Last 10 Encounters:   22 86.2 kg (190 lb)   22 85.9 kg (189 lb 6.4 oz)   22 86.5 kg (190 lb 9.6 oz)   22 87.2 kg (192 lb 3.2 oz)   22 87 kg (191 lb 12.8 oz)   22 87.3 kg (192 lb 6.4 oz)   06/02/22 88.5 kg (195 lb)   05/31/22 88.4 kg (194 lb 12.8 oz)   05/27/22 88 kg (194 lb)   05/25/22 88.8 kg (195 lb 12.8 oz)      IBW:154 # +/- 10%  %IBW: 123 % +/- 10%    BMI: 28.8 kg/M2 Category: overweight  Waist: 40 inches (measured at intake)    Reported Wt Hx: 180s in 1995, gained weight after birth of his 3rd child, got up to about 205 lbs prior to stent, started to lose about 20 lbs starting even before stent over the past 3 months    Reported Diet Hx:    Rate Your Plate Score: 55  (Score 58-72: Making many healthy choices; 41-57: Some choices need improving 24-40: many choices need improving)    24 HOUR DIET RECALL  Breakfast Donut (does not eat breakfast on the week days)   Lunch Keller (no salt turkey or ham w/ cheese) on 40 calorie white bread, pretzels   Dinner Grilled chicken, salad, watergate salad (with sugar free pudding, marshmallows, cool whip, pineapple)   Snacks    Beverages Couple cups of coffee (black), water     iOpener. States cut back on snacking (was doing late night snacking, now not at all) and portions overall. Has also cut back on sweets and caffeine. Was drinking 7 or more cups of coffee a day, now down to a couple a day. Typically skips breakfast, lunch is packed either salad (with a lite dressing) or sandwich with yogurt ([employer] [place]) - now using less armstrong on sandwich. Eats fruit a couple times a week and juice every other day, eats vegetables - mostly salad. Take out is frequent - [employer], [employer] - 3 times a week. Environmental/Social: self-employed , resides with wife who attends with him today        NUTRITION INTERVENTION:  Nutrition 60 minute one-on-one education & goal setting with iOpener. Reviewed with iOpener. relevant labs compared to ideals.     Reviewed weight history and patient's verbalized weight goal as well as any real or perceived barriers to obtaining the goal. Collaborated with patient to set a specific short and long term weight goal.     Reviewed Rate Your Plate and conducted a verbal diet recall. Assessed for environmental, financial, psychosocial, physical and comorbidities that may impact the food and eating patterns / behaviors of Act[employer]. Collaborated with patient to set specific nutrient goals as well as specific food / behavior changes that will help patient meet the overall goal of following a heart healthy eating pattern (using guidelines as set forth by the American Heart Association and modeled after healthful eating patterns as recognized by the USDA Dietary Guidelines such as DASH, Mediterranean or plant-based). Briefly reviewed with [employer]. the nutrition information in the Cardiac Rehab patient education book and encouraged Act[employer]. to read thoroughly, ask questions as needed, and use for future reference for heart healthy nutrition information. [employer]. is encouraged to participate in Cardiac Rehab group nutrition classes. PATIENT GOALS:    Weight Goals:  Short Term Weight Goal:185 lbs  Long Term Weight Goal:175-180 lbs    Nutrition Goals:  Daily Recommendations:  Calories: 2000 /day  (using Manoj Alecia with AF 1.3)    Saturated Fat: no more than 11 g/day  Trans Fat: 0 g/day  Sodium: no more than 2300 mg/day  Fruit: 2-2.5 cups / day  Vegetables: 2-2.5 or more cups/day    Other:  - read and compare food labels  - make 50% of meal from veggies / fruit  - continue changes already made        Questions addressed. Follow-up plans discussed. Actimo. verbalized understanding.             Randy Mack RD

## 2022-07-27 ENCOUNTER — HOSPITAL ENCOUNTER (OUTPATIENT)
Dept: CARDIAC REHAB | Age: 72
Discharge: HOME | End: 2022-07-27
Payer: MEDICARE

## 2022-07-27 VITALS — BODY MASS INDEX: 28.59 KG/M2 | WEIGHT: 188 LBS

## 2022-07-27 PROCEDURE — 93798 PHYS/QHP OP CAR RHAB W/ECG: CPT

## 2022-07-29 ENCOUNTER — HOSPITAL ENCOUNTER (OUTPATIENT)
Dept: CARDIAC REHAB | Age: 72
Discharge: HOME | End: 2022-07-29
Payer: MEDICARE

## 2022-07-29 VITALS — WEIGHT: 188 LBS | BODY MASS INDEX: 28.59 KG/M2

## 2022-07-29 PROCEDURE — 93798 PHYS/QHP OP CAR RHAB W/ECG: CPT

## 2022-08-03 ENCOUNTER — HOSPITAL ENCOUNTER (OUTPATIENT)
Dept: CARDIAC REHAB | Age: 72
Discharge: HOME | End: 2022-08-03
Payer: MEDICARE

## 2022-08-03 VITALS — WEIGHT: 187.8 LBS | BODY MASS INDEX: 28.55 KG/M2

## 2022-08-03 PROCEDURE — 93798 PHYS/QHP OP CAR RHAB W/ECG: CPT

## 2022-08-10 ENCOUNTER — HOSPITAL ENCOUNTER (OUTPATIENT)
Dept: CARDIAC REHAB | Age: 72
Discharge: HOME | End: 2022-08-10
Payer: MEDICARE

## 2022-08-10 VITALS — WEIGHT: 186.8 LBS | BODY MASS INDEX: 28.4 KG/M2

## 2022-08-10 PROCEDURE — 93798 PHYS/QHP OP CAR RHAB W/ECG: CPT

## 2022-08-12 ENCOUNTER — HOSPITAL ENCOUNTER (OUTPATIENT)
Dept: CARDIAC REHAB | Age: 72
Discharge: HOME | End: 2022-08-12
Payer: MEDICARE

## 2022-08-12 VITALS — BODY MASS INDEX: 28.25 KG/M2 | WEIGHT: 185.8 LBS

## 2022-08-12 PROCEDURE — 93798 PHYS/QHP OP CAR RHAB W/ECG: CPT

## 2022-08-12 PROCEDURE — 93797 PHYS/QHP OP CAR RHAB WO ECG: CPT

## 2022-08-18 ENCOUNTER — HOSPITAL ENCOUNTER (OUTPATIENT)
Dept: CARDIAC REHAB | Age: 72
Discharge: HOME | End: 2022-08-18
Payer: MEDICARE

## 2022-08-18 VITALS — WEIGHT: 185.2 LBS | BODY MASS INDEX: 28.16 KG/M2

## 2022-08-18 PROCEDURE — 93798 PHYS/QHP OP CAR RHAB W/ECG: CPT

## 2022-08-23 ENCOUNTER — HOSPITAL ENCOUNTER (OUTPATIENT)
Dept: CARDIAC REHAB | Age: 72
Discharge: HOME | End: 2022-08-23
Payer: MEDICARE

## 2022-08-23 VITALS — BODY MASS INDEX: 28.28 KG/M2 | WEIGHT: 186 LBS

## 2022-08-23 PROCEDURE — 93798 PHYS/QHP OP CAR RHAB W/ECG: CPT

## 2022-08-30 ENCOUNTER — HOSPITAL ENCOUNTER (OUTPATIENT)
Dept: CARDIAC REHAB | Age: 72
Discharge: HOME | End: 2022-08-30
Payer: MEDICARE

## 2022-08-30 VITALS — BODY MASS INDEX: 28.28 KG/M2 | WEIGHT: 186 LBS

## 2022-08-30 PROCEDURE — 93798 PHYS/QHP OP CAR RHAB W/ECG: CPT

## 2022-09-20 ENCOUNTER — HOSPITAL ENCOUNTER (OUTPATIENT)
Dept: CARDIAC REHAB | Age: 72
Discharge: HOME | End: 2022-09-20
Payer: MEDICARE

## 2022-09-20 VITALS — BODY MASS INDEX: 28.21 KG/M2 | WEIGHT: 185.5 LBS

## 2022-09-20 PROCEDURE — 93798 PHYS/QHP OP CAR RHAB W/ECG: CPT

## 2022-09-22 ENCOUNTER — HOSPITAL ENCOUNTER (OUTPATIENT)
Dept: CARDIAC REHAB | Age: 72
Discharge: HOME | End: 2022-09-22
Payer: MEDICARE

## 2022-09-22 VITALS — WEIGHT: 185.4 LBS | BODY MASS INDEX: 28.19 KG/M2

## 2022-09-22 PROCEDURE — 93798 PHYS/QHP OP CAR RHAB W/ECG: CPT

## 2022-09-27 ENCOUNTER — HOSPITAL ENCOUNTER (OUTPATIENT)
Dept: CARDIAC REHAB | Age: 72
Discharge: HOME | End: 2022-09-27
Payer: MEDICARE

## 2022-09-27 VITALS — BODY MASS INDEX: 28.19 KG/M2 | WEIGHT: 185.4 LBS

## 2022-09-27 PROCEDURE — 93798 PHYS/QHP OP CAR RHAB W/ECG: CPT

## 2022-10-06 ENCOUNTER — HOSPITAL ENCOUNTER (OUTPATIENT)
Dept: CARDIAC REHAB | Age: 72
Discharge: HOME | End: 2022-10-06
Payer: MEDICARE

## 2022-10-06 VITALS — WEIGHT: 184.6 LBS | BODY MASS INDEX: 28.07 KG/M2

## 2022-10-06 PROCEDURE — 93798 PHYS/QHP OP CAR RHAB W/ECG: CPT

## 2022-10-14 ENCOUNTER — HOSPITAL ENCOUNTER (OUTPATIENT)
Dept: CARDIAC REHAB | Age: 72
Discharge: HOME | End: 2022-10-14
Payer: MEDICARE

## 2022-10-14 VITALS — WEIGHT: 185.4 LBS | BODY MASS INDEX: 28.19 KG/M2

## 2022-10-14 PROCEDURE — 93798 PHYS/QHP OP CAR RHAB W/ECG: CPT

## 2022-10-17 ENCOUNTER — TELEPHONE (OUTPATIENT)
Dept: CARDIOLOGY CLINIC | Age: 72
End: 2022-10-17

## 2022-10-19 ENCOUNTER — HOSPITAL ENCOUNTER (OUTPATIENT)
Dept: CARDIAC REHAB | Age: 72
Discharge: HOME | End: 2022-10-19
Payer: MEDICARE

## 2022-10-19 VITALS — WEIGHT: 185.8 LBS | BODY MASS INDEX: 28.25 KG/M2

## 2022-10-19 PROCEDURE — 93798 PHYS/QHP OP CAR RHAB W/ECG: CPT

## 2022-10-21 ENCOUNTER — HOSPITAL ENCOUNTER (OUTPATIENT)
Dept: CARDIAC REHAB | Age: 72
Discharge: HOME | End: 2022-10-21
Payer: MEDICARE

## 2022-10-21 VITALS — WEIGHT: 185.6 LBS | BODY MASS INDEX: 28.22 KG/M2

## 2022-10-21 PROCEDURE — 93798 PHYS/QHP OP CAR RHAB W/ECG: CPT

## 2022-10-27 ENCOUNTER — OFFICE VISIT (OUTPATIENT)
Dept: CARDIOLOGY CLINIC | Age: 72
End: 2022-10-27
Payer: MEDICARE

## 2022-10-27 VITALS
HEART RATE: 66 BPM | HEIGHT: 68 IN | WEIGHT: 183.2 LBS | BODY MASS INDEX: 27.77 KG/M2 | OXYGEN SATURATION: 97 % | DIASTOLIC BLOOD PRESSURE: 74 MMHG | SYSTOLIC BLOOD PRESSURE: 122 MMHG

## 2022-10-27 DIAGNOSIS — I20.8 STABLE ANGINA PECTORIS (HCC): ICD-10-CM

## 2022-10-27 DIAGNOSIS — R07.9 CHEST PAIN, UNSPECIFIED TYPE: ICD-10-CM

## 2022-10-27 DIAGNOSIS — I25.10 CORONARY ARTERY DISEASE INVOLVING NATIVE CORONARY ARTERY OF NATIVE HEART, UNSPECIFIED WHETHER ANGINA PRESENT: Primary | ICD-10-CM

## 2022-10-27 DIAGNOSIS — E78.5 DYSLIPIDEMIA: ICD-10-CM

## 2022-10-27 PROCEDURE — 99214 OFFICE O/P EST MOD 30 MIN: CPT | Performed by: INTERNAL MEDICINE

## 2022-10-27 PROCEDURE — G8427 DOCREV CUR MEDS BY ELIG CLIN: HCPCS | Performed by: INTERNAL MEDICINE

## 2022-10-27 PROCEDURE — G8417 CALC BMI ABV UP PARAM F/U: HCPCS | Performed by: INTERNAL MEDICINE

## 2022-10-27 PROCEDURE — G8536 NO DOC ELDER MAL SCRN: HCPCS | Performed by: INTERNAL MEDICINE

## 2022-10-27 PROCEDURE — 1123F ACP DISCUSS/DSCN MKR DOCD: CPT | Performed by: INTERNAL MEDICINE

## 2022-10-27 PROCEDURE — G0463 HOSPITAL OUTPT CLINIC VISIT: HCPCS | Performed by: INTERNAL MEDICINE

## 2022-10-27 PROCEDURE — 3017F COLORECTAL CA SCREEN DOC REV: CPT | Performed by: INTERNAL MEDICINE

## 2022-10-27 PROCEDURE — 1101F PT FALLS ASSESS-DOCD LE1/YR: CPT | Performed by: INTERNAL MEDICINE

## 2022-10-27 PROCEDURE — G8432 DEP SCR NOT DOC, RNG: HCPCS | Performed by: INTERNAL MEDICINE

## 2022-10-27 NOTE — PROGRESS NOTES
Christopher Marquez. is a 81 y.o. male    Chief Complaint   Patient presents with    Follow-up     6 month     Coronary Artery Disease    Chest Pain       Chest pain no    SOB no    Dizziness no    Swelling no    Refills no    Visit Vitals  /74 (BP 1 Location: Left upper arm, BP Patient Position: Sitting)   Pulse 66   Ht 5' 8\" (1.727 m)   Wt 183 lb 3.2 oz (83.1 kg)   SpO2 97%   BMI 27.86 kg/m²       1. Have you been to the ER, urgent care clinic since your last visit? Hospitalized since your last visit? No    2. Have you seen or consulted any other health care providers outside of the [address] [place] [address] since your last visit? Include any pap smears or colon screening.  No

## 2022-10-27 NOTE — PROGRESS NOTES
Office Follow-up    NAME: Ethan Reaves. :  1950  MRM:  [id number]    Date:  10/27/2022         Assessment and Plan:     CAD (2022), LAD 99% status post PCI, left main mild distal left main disease, obtuse marginal ostial 50 to 60% stenosis, OM 2 mild disease, OM 3 mild disease, PDA mild diffuse disease: Continue medical management with aspirin, Brilinta and statins. I will change his Brillinta to Plavix after one year of PCI. Hypertension: Blood pressure is controlled. Continue lisinopril. See Dr. Albania Forrester in 6 months. Lab results from  were personally reviewed. CBC was normal.  Platelet count currently 75,000. Creatinine 1.1. Total cholesterol 124, triglycerides 73, HDL 51, LDL 58. Hemoglobin A1c was 5.6. He completed cardiac rehab. He is [facility]     ATTENTION:   This medical record was transcribed using an electronic medical records/speech recognition system. Although proofread, it may and can contain electronic, spelling and other errors. Corrections may be executed at a later time. Please feel free to contact us for any clarifications as needed. Subjective:     Ethan Reaves., a 81y.o. year-old who presents for followup. He underwent left heart catheterization on 2022 and was found to have 99% mid LAD lesion. He underwent PCI with stenting. He is returning today for follow-up. Overall he feels okay without any significant chest pain.    ---------------    22:   HPI: Ramsey Montaño is a 73 y.o. male with no significant past medical history is here for evaluation of symptoms of chest pain.   Symptoms started few months ago and the first episode was in the 2021 when he had an episode of chest pain which lasted for few minutes it was moderate intensity however it resolved spontaneously and thereafter he did not have symptoms until recentlyOn  while he was walking his dog has symptoms of similar chest pain which improved upon resting. He had another bout of chest pain the following day while he was active and associated with the symptoms of chest pain he also felt some tingling or numbness sensation in both the arms. Having a third episode of chest pain he came to thef ER by which time his symptoms of chest pain had resolved. His EKG in the ER performed on March 20, 2022 was personally reviewed. EKG at that time did not demonstrate anything significant. His cardiac enzyme initially was troponin at 15 with a follow-up at 18. ER consultation with the ER physician at that time decision making was to discharge him from the ER and to follow-up with a stress test.  He is coming into our office today and had a stress test performed. Of note he does not have any previous cardiac history. He does not smoke tobacco.  His only risk factor from the family history standpoint as his maternal grandmother had CAD. Exam:     Physical Exam:  Visit Vitals  /74 (BP 1 Location: Left upper arm, BP Patient Position: Sitting)   Pulse 66   Ht 5' 8\" (1.727 m)   Wt 183 lb 3.2 oz (83.1 kg)   SpO2 97%   BMI 27.86 kg/m²     General appearance - alert, well appearing, and in no distress  Mental status - affect appropriate to mood  Eyes - sclera anicteric, moist mucous membranes  Neck - supple, no significant adenopathy  Chest - clear to auscultation, no wheezes, rales or rhonchi  Heart - normal rate, regular rhythm, normal S1, S2, no murmurs, rubs, clicks or gallops  Abdomen - soft, nontender, nondistended, no masses or organomegaly  Extremities - peripheral pulses normal, no pedal edema  Skin - normal coloration  no rashes    Medications:     Current Outpatient Medications   Medication Sig    ticagrelor (BRILINTA) 90 mg tablet Take 1 Tablet by mouth every twelve (12) hours every twelve (12) hours. acetaminophen (TYLENOL) 325 mg tablet Take 500 mg by mouth every four (4) hours as needed for Pain.     lisinopriL (PRINIVIL, ZESTRIL) 10 mg tablet Take 10 mg by mouth daily. aspirin delayed-release 81 mg tablet Take 1 Tablet by mouth daily. rosuvastatin (CRESTOR) 20 mg tablet Take 1 Tablet by mouth nightly. (Patient taking differently: Take 40 mg by mouth nightly.)    nitroglycerin (NITROSTAT) 0.4 mg SL tablet Dissolve one tablet under tongue as needed for chest pain. Wait 5 minutes, if no relief, take one more tablet, call 911. Can take up to 3 doses. No current facility-administered medications for this visit. Diagnostic Data Review:       No specialty comments available. Lab Review:     Lab Results   Component Value Date/Time    Cholesterol, total 120 04/07/2022 01:17 AM    HDL Cholesterol 36 04/07/2022 01:17 AM    LDL, calculated 53.6 04/07/2022 01:17 AM    Triglyceride 152 (H) 04/07/2022 01:17 AM    CHOL/HDL Ratio 3.3 04/07/2022 01:17 AM     Lab Results   Component Value Date/Time    Creatinine 1.20 04/07/2022 01:17 AM     Lab Results   Component Value Date/Time    BUN 19 04/07/2022 01:17 AM     Lab Results   Component Value Date/Time    Potassium 4.0 04/07/2022 01:17 AM     No results found for: HBA1C, [id number], [id number]  Lab Results   Component Value Date/Time    HGB 14.3 04/07/2022 01:17 AM     Lab Results   Component Value Date/Time    PLATELET 350 11/56/5275 01:17 AM     No results for input(s): CPK, CKMB, TROIQ in the last 72 hours. No lab exists for component: CKQMB, CPKMB             ___________________________________________________    Stephanie Portillo.  Hira Zendejas MD, [facility] [place]

## 2023-01-17 ENCOUNTER — DOCUMENTATION ONLY (OUTPATIENT)
Dept: CARDIOLOGY CLINIC | Age: 73
End: 2023-01-17

## 2023-01-17 NOTE — LETTER
1/17/2023 10:02 AM    . [facility]        To Whom it may Concern. Bess Perry underwent cardiac evaluation and based on the available data he is a low risk candidate from cardiac standpoint to undergo an low risk surgery. Bess Perry may proceed with planned Dental procedure/ surgery. You may have the pt hold his Brilinta, for up to 5 days prior to the procedure. Then have the pt resume this medication when the surgeon deems it safe to do so. Thanks for giving us the opportunity to participate in the care of your patient. Sincerely,            Jose Be MD

## 2023-01-17 NOTE — PROGRESS NOTES
Per Dr. Jose Antonio Theodore:OK to hold Hector  as it as been more than 6 months for his stent. Resume Aram post dental visit.      [facility] [address] [facility] [phone]

## 2023-01-29 ENCOUNTER — HOSPITAL ENCOUNTER (EMERGENCY)
Age: 73
Discharge: HOME | End: 2023-01-29
Attending: EMERGENCY MEDICINE
Payer: MEDICARE

## 2023-01-29 ENCOUNTER — APPOINTMENT (OUTPATIENT)
Dept: GENERAL RADIOLOGY | Age: 73
End: 2023-01-29
Attending: EMERGENCY MEDICINE
Payer: MEDICARE

## 2023-01-29 VITALS
SYSTOLIC BLOOD PRESSURE: 161 MMHG | OXYGEN SATURATION: 98 % | HEART RATE: 71 BPM | BODY MASS INDEX: 26.66 KG/M2 | TEMPERATURE: 97.7 F | HEIGHT: 69 IN | WEIGHT: 180 LBS | DIASTOLIC BLOOD PRESSURE: 80 MMHG | RESPIRATION RATE: 12 BRPM

## 2023-01-29 DIAGNOSIS — R07.9 CHEST PAIN, UNSPECIFIED TYPE: Primary | ICD-10-CM

## 2023-01-29 LAB
ALBUMIN SERPL-MCNC: 3.7 G/DL (ref 3.5–5)
ALBUMIN/GLOB SERPL: 1.3 (ref 1.1–2.2)
ALP SERPL-CCNC: 48 U/L (ref 45–117)
ALT SERPL-CCNC: 37 U/L (ref 12–78)
ANION GAP SERPL CALC-SCNC: 8 MMOL/L (ref 5–15)
AST SERPL-CCNC: 23 U/L (ref 15–37)
ATRIAL RATE: 76 BPM
BASOPHILS # BLD: 0 K/UL (ref 0–0.1)
BASOPHILS NFR BLD: 0 % (ref 0–1)
BILIRUB SERPL-MCNC: 0.4 MG/DL (ref 0.2–1)
BUN SERPL-MCNC: 21 MG/DL (ref 6–20)
BUN/CREAT SERPL: 20 (ref 12–20)
CALCIUM SERPL-MCNC: 8.8 MG/DL (ref 8.5–10.1)
CALCULATED P AXIS, ECG09: 72 DEGREES
CALCULATED R AXIS, ECG10: 68 DEGREES
CALCULATED T AXIS, ECG11: 65 DEGREES
CHLORIDE SERPL-SCNC: 108 MMOL/L (ref 97–108)
CO2 SERPL-SCNC: 25 MMOL/L (ref 21–32)
COMMENT, HOLDF: NORMAL
CREAT SERPL-MCNC: 1.06 MG/DL (ref 0.7–1.3)
DIAGNOSIS, 93000: NORMAL
DIFFERENTIAL METHOD BLD: NORMAL
EOSINOPHIL # BLD: 0.1 K/UL (ref 0–0.4)
EOSINOPHIL NFR BLD: 2 % (ref 0–7)
ERYTHROCYTE [DISTWIDTH] IN BLOOD BY AUTOMATED COUNT: 13.5 % (ref 11.5–14.5)
GLOBULIN SER CALC-MCNC: 2.9 G/DL (ref 2–4)
GLUCOSE SERPL-MCNC: 126 MG/DL (ref 65–100)
HCT VFR BLD AUTO: 41.5 % (ref 36.6–50.3)
HGB BLD-MCNC: 14.1 G/DL (ref 12.1–17)
IMM GRANULOCYTES # BLD AUTO: 0 K/UL (ref 0–0.04)
IMM GRANULOCYTES NFR BLD AUTO: 0 % (ref 0–0.5)
LYMPHOCYTES # BLD: 1.4 K/UL (ref 0.8–3.5)
LYMPHOCYTES NFR BLD: 20 % (ref 12–49)
MCH RBC QN AUTO: 29.7 PG (ref 26–34)
MCHC RBC AUTO-ENTMCNC: 34 G/DL (ref 30–36.5)
MCV RBC AUTO: 87.6 FL (ref 80–99)
MONOCYTES # BLD: 0.5 K/UL (ref 0–1)
MONOCYTES NFR BLD: 8 % (ref 5–13)
NEUTS SEG # BLD: 4.7 K/UL (ref 1.8–8)
NEUTS SEG NFR BLD: 70 % (ref 32–75)
NRBC # BLD: 0 K/UL (ref 0–0.01)
NRBC BLD-RTO: 0 PER 100 WBC
P-R INTERVAL, ECG05: 182 MS
PLATELET # BLD AUTO: 164 K/UL (ref 150–400)
PMV BLD AUTO: 9 FL (ref 8.9–12.9)
POTASSIUM SERPL-SCNC: 4 MMOL/L (ref 3.5–5.1)
PROT SERPL-MCNC: 6.6 G/DL (ref 6.4–8.2)
Q-T INTERVAL, ECG07: 404 MS
QRS DURATION, ECG06: 80 MS
QTC CALCULATION (BEZET), ECG08: 454 MS
RBC # BLD AUTO: 4.74 M/UL (ref 4.1–5.7)
SAMPLES BEING HELD,HOLD: NORMAL
SODIUM SERPL-SCNC: 141 MMOL/L (ref 136–145)
TROPONIN-HIGH SENSITIVITY: 7 NG/L (ref 0–76)
TROPONIN-HIGH SENSITIVITY: 8 NG/L (ref 0–76)
VENTRICULAR RATE, ECG03: 76 BPM
WBC # BLD AUTO: 6.8 K/UL (ref 4.1–11.1)

## 2023-01-29 PROCEDURE — 84484 ASSAY OF TROPONIN QUANT: CPT

## 2023-01-29 PROCEDURE — 80053 COMPREHEN METABOLIC PANEL: CPT

## 2023-01-29 PROCEDURE — 36415 COLL VENOUS BLD VENIPUNCTURE: CPT

## 2023-01-29 PROCEDURE — 93005 ELECTROCARDIOGRAM TRACING: CPT

## 2023-01-29 PROCEDURE — 71045 X-RAY EXAM CHEST 1 VIEW: CPT

## 2023-01-29 PROCEDURE — 85025 COMPLETE CBC W/AUTO DIFF WBC: CPT

## 2023-01-29 PROCEDURE — 99285 EMERGENCY DEPT VISIT HI MDM: CPT

## 2023-01-29 NOTE — ED PROVIDER NOTES
14-year-old male with history of coronary disease with PCI in April of last year, hypertension presents to the emergency department chief complaint of chest pain. Began having some right-sided chest discomfort several days ago but has since settled to his left anterior chest region. He took a nitroglycerin today which did not seem to change his symptoms at all. No fevers or chills or cough or shortness of breath. No nausea or vomiting or diaphoresis. The history is provided by the patient and medical records. Chest Pain (Angina)   This is a new problem. Pertinent negatives include no fever and no shortness of breath. Past Medical History:   Diagnosis Date    CAD (coronary artery disease)     PCI/PATRIA 4/6/2022    Hypertension        No past surgical history on file.       Family History:   Problem Relation Age of Onset    Cancer Mother     Elevated Lipids Mother     Cancer Sister     Elevated Lipids Brother        Social History     Socioeconomic History    Marital status:      Spouse name: Christian Nogueira    Number of children: 3    Years of education: Not on file    Highest education level: Master's degree (e.g., MA, MS, Howard, MEd, MSW, PATRICIA)   Occupational History    Not on file   Tobacco Use    Smoking status: Never    Smokeless tobacco: Never   Vaping Use    Vaping Use: Never used   Substance and Sexual Activity    Alcohol use: Not Currently     Alcohol/week: 2.0 standard drinks     Types: 2 Shots of liquor per week     Comment: beer    Drug use: Never    Sexual activity: Not on file   Other Topics Concern     Service No    Blood Transfusions Not Asked    Caffeine Concern No    Occupational Exposure Not Asked    Hobby Hazards Not Asked    Sleep Concern Not Asked    Stress Concern Not Asked    Weight Concern Not Asked    Special Diet Not Asked    Back Care Not Asked    Exercise Not Asked    Bike Helmet Not Asked    Seat Belt Not Asked    Self-Exams Not Asked   Social History Narrative    Not on file     Social Determinants of Health     Financial Resource Strain: Not on file   Food Insecurity: Not on file   Transportation Needs: Not on file   Physical Activity: Not on file   Stress: Not on file   Social Connections: Not on file   Intimate Partner Violence: Not on file   Housing Stability: Not on file         ALLERGIES: Patient has no known allergies. Review of Systems   Constitutional:  Negative for fatigue and fever. Respiratory:  Negative for shortness of breath. Cardiovascular:  Positive for chest pain. Vitals:    01/29/23 0617   BP: (!) 164/84   Pulse: 75   Resp: 14   Temp: 97.7 °F (36.5 °C)   SpO2: 99%   Weight: 81.6 kg (180 lb)   Height: 5' 9\" (1.753 m)            Physical Exam  Vitals and nursing note reviewed. Constitutional:       General: He is not in acute distress. Appearance: Normal appearance. He is not ill-appearing. Cardiovascular:      Rate and Rhythm: Normal rate. Pulses: Normal pulses. Pulmonary:      Effort: Pulmonary effort is normal. No respiratory distress. Abdominal:      General: There is no distension. Palpations: Abdomen is soft. Tenderness: There is no abdominal tenderness. Musculoskeletal:         General: Normal range of motion. Skin:     General: Skin is warm and dry. Capillary Refill: Capillary refill takes less than 2 seconds. Neurological:      General: No focal deficit present. Mental Status: He is alert and oriented to person, place, and time. Psychiatric:         Mood and Affect: Mood normal.         Behavior: Behavior normal.        Medical Decision Making  68-year-old male presents as above with complaint of chest pain. Intermittent for the past couple days but somewhat more constant since 4 this morning. He was turned over to oncoming physician pending remainder of his work-up.    7:00 AM  Change of shift. Care of patient signed over to Dr nAdrea Ruiz. Handoff complete.       Amount and/or Complexity of Data Reviewed  External Data Reviewed: notes. Labs: ordered. Radiology: ordered and independent interpretation performed. Decision-making details documented in ED Course. ECG/medicine tests: ordered and independent interpretation performed. Decision-making details documented in ED Course. ED Course as of 01/29/23 0651   Warren Saavedra Jan 29, 2023   0617   ED EKG interpretation:  Rhythm: normal sinus rhythm. Rate (approx.): 76. Axis: normal.  ST segment:  No concerning ST elevations or depressions. This EKG was interpreted by Trina Mendiola MD,ED Provider. [JM]   7712 I have independently viewed the obtained radiographic images and note chest x-ray without acute findings. Will await radiology read.  [JM]      ED Course User Index  [JM] Renetta Adame MD       Procedures

## 2023-01-29 NOTE — ED NOTES
Repeat troponin collected and to lab, patient requesting to use restroom, removed from monitor, patient ambulatory to restroom with steady gait

## 2023-01-29 NOTE — ED TRIAGE NOTES
Patient states began several days ago with pain in the right chest, which radiated to the right back and right axilla, down into the right arm. That pain has since mostly resolved, and now is having pain in the mid chest.  History of a stent in the LAD last year.

## 2023-01-29 NOTE — ED NOTES
Care assumed from Dr. Margo Arriaga at 7 AM.  Please see his note for full H&P.  79-year-old male with a history of CAD presents with chest pain since Thursday more constant since this morning. Slightly reproducible on exam reportedly. CXR negative. Labs pending at time of signout. Labs returned reassuringly showing no significant abnormalities, negative initial troponin. We will plan for repeat troponin which returned negative. He remained very well-appearing in no acute distress while in the ED. Suspect muscular etiology. Recommended PCP and cardiology follow-up for further evaluation and return precautions were given for worsening or concerns. This was discussed with the patient at the bedside and he stated both understanding and agreement.

## 2023-01-29 NOTE — ED NOTES
Humphrey Yates MD at bedside for eval;;     0713 - Bedside and Verbal shift change report given to Loi Barkley (oncoming nurse) by Leo Alex (offgoing nurse). Report included the following information SBAR, Kardex, ED Summary, Intake/Output, and MAR.      Mahesh Bonilla MD at bedside for reeval - pt will have a redraw at 0830 for a troponin;;

## 2023-04-27 ENCOUNTER — OFFICE VISIT (OUTPATIENT)
Dept: CARDIOLOGY CLINIC | Age: 73
End: 2023-04-27
Payer: MEDICARE

## 2023-04-27 VITALS
WEIGHT: 189 LBS | SYSTOLIC BLOOD PRESSURE: 128 MMHG | DIASTOLIC BLOOD PRESSURE: 81 MMHG | OXYGEN SATURATION: 98 % | BODY MASS INDEX: 27.99 KG/M2 | HEART RATE: 68 BPM | HEIGHT: 69 IN

## 2023-04-27 DIAGNOSIS — E78.5 DYSLIPIDEMIA: ICD-10-CM

## 2023-04-27 DIAGNOSIS — I25.10 CORONARY ARTERY DISEASE INVOLVING NATIVE CORONARY ARTERY OF NATIVE HEART, UNSPECIFIED WHETHER ANGINA PRESENT: Primary | ICD-10-CM

## 2023-04-27 PROCEDURE — G0463 HOSPITAL OUTPT CLINIC VISIT: HCPCS | Performed by: INTERNAL MEDICINE

## 2023-04-27 RX ORDER — ISOSORBIDE MONONITRATE 30 MG/1
30 TABLET, EXTENDED RELEASE ORAL
Qty: 90 TABLET | Refills: 3 | Status: SHIPPED | OUTPATIENT
Start: 2023-04-27

## 2023-04-27 NOTE — PROGRESS NOTES
All orders entered per verbal orders of Dr. Parul Ovalles MD:     Stop Brillinta     Start Imdur 30mg po daily. See Dr. Luis Espinosa in 1 year. Refill per VO of Dr. Parul Ovalles:    Last appt: 10/27/2022    No future appointments. Requested Prescriptions     Pending Prescriptions Disp Refills    isosorbide mononitrate ER (IMDUR) 30 mg tablet 90 Tablet 3     Sig: Take 1 Tablet by mouth daily (with breakfast).

## 2023-04-27 NOTE — PROGRESS NOTES
Office Follow-up    NAME: Coreen Stroud. :  1950  MRM:  [id number]    Date:  2023         Assessment and Plan:     CAD (2022), LAD 99% status post PCI, left main mild distal left main disease, obtuse marginal ostial 50 to 60% stenosis, OM 2 mild disease, OM 3 mild disease, PDA mild diffuse disease: He has completed 1 year of Brilinta. Based on his anatomy and residual disease in the branch vessels of the diagonal as well as distal LAD and obtuse marginal arteries okay to stop Brilinta however replace it with a vasodilator such as isosorbide. We will continue aspirin, rosuvastatin and lisinopril. I was personally able to review his most recent lab work from 2023. His LDL is 53, HDL is 63 creatinine is 1.2. Hypertension: Blood pressure is controlled. Continue lisinopril. See Dr. Mark Spivey in 1 yr. 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 ATTENTION:   This medical record was transcribed using an electronic medical records/speech recognition system. Although proofread, it may and can contain electronic, spelling and other errors. Corrections may be executed at a later time. Please feel free to contact us for any clarifications as needed. Subjective:     Coreen Stroud., a 81y.o. year-old who presents for followup. He underwent left heart catheterization on 2022 and was found to have 99% mid LAD lesion. He underwent PCI with stenting. He is returning today for follow-up. Overall he feels okay without any significant chest pain.    ---------------    22:   HPI: Ayaka Recinos is a 73 y.o. male with no significant past medical history is here for evaluation of symptoms of chest pain.   Symptoms started few months ago and the first episode was in the 2021 when he had an episode of chest pain which lasted for few minutes it was moderate intensity however it resolved spontaneously and thereafter he did not have symptoms until recentlyOn March 19 while he was walking his dog has symptoms of similar chest pain which improved upon resting. He had another bout of chest pain the following day while he was active and associated with the symptoms of chest pain he also felt some tingling or numbness sensation in both the arms. Having a third episode of chest pain he came to thef ER by which time his symptoms of chest pain had resolved. His EKG in the ER performed on March 20, 2022 was personally reviewed. EKG at that time did not demonstrate anything significant. His cardiac enzyme initially was troponin at 15 with a follow-up at 18. ER consultation with the ER physician at that time decision making was to discharge him from the ER and to follow-up with a stress test.  He is coming into our office today and had a stress test performed. Of note he does not have any previous cardiac history. He does not smoke tobacco.  His only risk factor from the family history standpoint as his maternal grandmother had CAD. Exam:     Physical Exam:  Visit Vitals  /81 (BP 1 Location: Right arm, BP Patient Position: Sitting)   Pulse 68   Ht 5' 9\" (1.753 m)   Wt 189 lb (85.7 kg)   SpO2 98%   BMI 27.91 kg/m²     General appearance - alert, well appearing, and in no distress  Mental status - affect appropriate to mood  Eyes - sclera anicteric, moist mucous membranes  Neck - supple, no significant adenopathy  Chest - clear to auscultation, no wheezes, rales or rhonchi  Heart - normal rate, regular rhythm, normal S1, S2, no murmurs, rubs, clicks or gallops  Abdomen - soft, nontender, nondistended, no masses or organomegaly  Extremities - peripheral pulses normal, no pedal edema  Skin - normal coloration  no rashes    Medications:     Current Outpatient Medications   Medication Sig    ticagrelor (BRILINTA) 90 mg tablet Take 1 Tablet by mouth every twelve (12) hours every twelve (12) hours.     acetaminophen (TYLENOL) 325 mg tablet Take 500 mg by mouth every four (4) hours as needed for Pain. lisinopriL (PRINIVIL, ZESTRIL) 10 mg tablet Take 1 Tablet by mouth daily. aspirin delayed-release 81 mg tablet Take 1 Tablet by mouth daily. rosuvastatin (CRESTOR) 20 mg tablet Take 1 Tablet by mouth nightly. (Patient taking differently: Take 2 Tablets by mouth nightly.)    nitroglycerin (NITROSTAT) 0.4 mg SL tablet Dissolve one tablet under tongue as needed for chest pain. Wait 5 minutes, if no relief, take one more tablet, call 911. Can take up to 3 doses. No current facility-administered medications for this visit. Diagnostic Data Review:       No specialty comments available. Lab Review:     Lab Results   Component Value Date/Time    Cholesterol, total 120 04/07/2022 01:17 AM    HDL Cholesterol 36 04/07/2022 01:17 AM    LDL, calculated 53.6 04/07/2022 01:17 AM    Triglyceride 152 (H) 04/07/2022 01:17 AM    CHOL/HDL Ratio 3.3 04/07/2022 01:17 AM     Lab Results   Component Value Date/Time    Creatinine 1.06 01/29/2023 06:24 AM     Lab Results   Component Value Date/Time    BUN 21 (H) 01/29/2023 06:24 AM     Lab Results   Component Value Date/Time    Potassium 4.0 01/29/2023 06:24 AM     No results found for: HBA1C, BPI4CGKV, VJI7HSLJ  Lab Results   Component Value Date/Time    HGB 14.1 01/29/2023 06:24 AM     Lab Results   Component Value Date/Time    PLATELET 326 63/94/4466 06:24 AM     No results for input(s): CPK, CKMB, TROIQ in the last 72 hours. No lab exists for component: CKQMB, CPKMB             ___________________________________________________    Kevin Ramos.  Francesca Hand MD, [facility]

## 2023-05-02 ENCOUNTER — TELEPHONE (OUTPATIENT)
Dept: CARDIOLOGY CLINIC | Age: 73
End: 2023-05-02

## 2023-11-17 ENCOUNTER — HOSPITAL ENCOUNTER (EMERGENCY)
Facility: HOSPITAL | Age: 73
Discharge: HOME | End: 2023-11-17
Attending: EMERGENCY MEDICINE
Payer: MEDICARE

## 2023-11-17 VITALS
RESPIRATION RATE: 11 BRPM | HEIGHT: 69 IN | HEART RATE: 61 BPM | BODY MASS INDEX: 26.51 KG/M2 | TEMPERATURE: 97.7 F | DIASTOLIC BLOOD PRESSURE: 84 MMHG | WEIGHT: 179 LBS | SYSTOLIC BLOOD PRESSURE: 134 MMHG | OXYGEN SATURATION: 97 %

## 2023-11-17 DIAGNOSIS — R55 VASOVAGAL SYNCOPE: Primary | ICD-10-CM

## 2023-11-17 LAB
ALBUMIN SERPL-MCNC: 3.2 G/DL (ref 3.5–5)
ALBUMIN/GLOB SERPL: 1.2 (ref 1.1–2.2)
ALP SERPL-CCNC: 43 U/L (ref 45–117)
ALT SERPL-CCNC: 25 U/L (ref 12–78)
ANION GAP BLD CALC-SCNC: 8 (ref 10–20)
ANION GAP SERPL CALC-SCNC: 7 MMOL/L (ref 5–15)
AST SERPL-CCNC: 20 U/L (ref 15–37)
BASE EXCESS BLD CALC-SCNC: 0 MMOL/L
BASOPHILS # BLD: 0 K/UL (ref 0–0.1)
BASOPHILS NFR BLD: 0 % (ref 0–1)
BILIRUB SERPL-MCNC: 0.3 MG/DL (ref 0.2–1)
BUN SERPL-MCNC: 20 MG/DL (ref 6–20)
BUN/CREAT SERPL: 21 (ref 12–20)
CA-I BLD-MCNC: 1.22 MMOL/L (ref 1.12–1.32)
CALCIUM SERPL-MCNC: 7.7 MG/DL (ref 8.5–10.1)
CHLORIDE BLD-SCNC: 109 MMOL/L (ref 100–108)
CHLORIDE SERPL-SCNC: 113 MMOL/L (ref 97–108)
CO2 BLD-SCNC: 25 MMOL/L (ref 19–24)
CO2 SERPL-SCNC: 22 MMOL/L (ref 21–32)
COMMENT:: NORMAL
CREAT SERPL-MCNC: 0.97 MG/DL (ref 0.7–1.3)
CREAT UR-MCNC: 1.1 MG/DL (ref 0.6–1.3)
DIFFERENTIAL METHOD BLD: NORMAL
EOSINOPHIL # BLD: 0.3 K/UL (ref 0–0.4)
EOSINOPHIL NFR BLD: 3 % (ref 0–7)
ERYTHROCYTE [DISTWIDTH] IN BLOOD BY AUTOMATED COUNT: 13.4 % (ref 11.5–14.5)
GLOBULIN SER CALC-MCNC: 2.6 G/DL (ref 2–4)
GLUCOSE BLD STRIP.AUTO-MCNC: 94 MG/DL (ref 74–106)
GLUCOSE SERPL-MCNC: 85 MG/DL (ref 65–100)
HCO3 BLDA-SCNC: 25 MMOL/L
HCT VFR BLD AUTO: 38 % (ref 36.6–50.3)
HGB BLD-MCNC: 12.4 G/DL (ref 12.1–17)
IMM GRANULOCYTES # BLD AUTO: 0 K/UL (ref 0–0.04)
IMM GRANULOCYTES NFR BLD AUTO: 0 % (ref 0–0.5)
LACTATE BLD-SCNC: 1.11 MMOL/L (ref 0.4–2)
LYMPHOCYTES # BLD: 2.6 K/UL (ref 0.8–3.5)
LYMPHOCYTES NFR BLD: 35 % (ref 12–49)
MCH RBC QN AUTO: 29.2 PG (ref 26–34)
MCHC RBC AUTO-ENTMCNC: 32.6 G/DL (ref 30–36.5)
MCV RBC AUTO: 89.4 FL (ref 80–99)
MONOCYTES # BLD: 0.6 K/UL (ref 0–1)
MONOCYTES NFR BLD: 8 % (ref 5–13)
NEUTS SEG # BLD: 4 K/UL (ref 1.8–8)
NEUTS SEG NFR BLD: 54 % (ref 32–75)
NRBC # BLD: 0 K/UL (ref 0–0.01)
NRBC BLD-RTO: 0 PER 100 WBC
PCO2 BLDV: 40.5 MMHG (ref 41–51)
PH BLDV: 7.4 (ref 7.32–7.42)
PLATELET # BLD AUTO: 187 K/UL (ref 150–400)
PMV BLD AUTO: 9.3 FL (ref 8.9–12.9)
PO2 BLDV: 53 MMHG (ref 25–40)
POTASSIUM BLD-SCNC: 4.1 MMOL/L (ref 3.5–5.5)
POTASSIUM SERPL-SCNC: 3.4 MMOL/L (ref 3.5–5.1)
PROT SERPL-MCNC: 5.8 G/DL (ref 6.4–8.2)
RBC # BLD AUTO: 4.25 M/UL (ref 4.1–5.7)
SAO2 % BLD: 87 %
SODIUM BLD-SCNC: 142 MMOL/L (ref 136–145)
SODIUM SERPL-SCNC: 142 MMOL/L (ref 136–145)
SPECIMEN HOLD: NORMAL
SPECIMEN SITE: ABNORMAL
WBC # BLD AUTO: 7.5 K/UL (ref 4.1–11.1)

## 2023-11-17 PROCEDURE — 82803 BLOOD GASES ANY COMBINATION: CPT

## 2023-11-17 PROCEDURE — 84295 ASSAY OF SERUM SODIUM: CPT

## 2023-11-17 PROCEDURE — 2580000003 HC RX 258: Performed by: EMERGENCY MEDICINE

## 2023-11-17 PROCEDURE — 82330 ASSAY OF CALCIUM: CPT

## 2023-11-17 PROCEDURE — 85025 COMPLETE CBC W/AUTO DIFF WBC: CPT

## 2023-11-17 PROCEDURE — 99284 EMERGENCY DEPT VISIT MOD MDM: CPT

## 2023-11-17 PROCEDURE — 36415 COLL VENOUS BLD VENIPUNCTURE: CPT

## 2023-11-17 PROCEDURE — 80053 COMPREHEN METABOLIC PANEL: CPT

## 2023-11-17 PROCEDURE — 84132 ASSAY OF SERUM POTASSIUM: CPT

## 2023-11-17 PROCEDURE — 82947 ASSAY GLUCOSE BLOOD QUANT: CPT

## 2023-11-17 RX ORDER — 0.9 % SODIUM CHLORIDE 0.9 %
1000 INTRAVENOUS SOLUTION INTRAVENOUS ONCE
Status: COMPLETED | OUTPATIENT
Start: 2023-11-17 | End: 2023-11-17

## 2023-11-17 RX ORDER — ISOSORBIDE DINITRATE 20 MG/1
20 TABLET ORAL DAILY
COMMUNITY

## 2023-11-17 RX ADMIN — SODIUM CHLORIDE 1000 ML: 9 INJECTION, SOLUTION INTRAVENOUS at 18:10

## 2023-11-17 ASSESSMENT — ENCOUNTER SYMPTOMS
DIARRHEA: 0
DIFFICULTY BREATHING: 0
COLOR CHANGE: 0
NAUSEA: 0
RECTAL BLEEDING: 0
ABDOMINAL PAIN: 0
VISUAL CHANGE: 0
BACK PAIN: 0
CONSTIPATION: 0
VOMITING: 0
SHORTNESS OF BREATH: 0

## 2023-11-17 ASSESSMENT — PAIN - FUNCTIONAL ASSESSMENT: PAIN_FUNCTIONAL_ASSESSMENT: NONE - DENIES PAIN

## 2023-11-17 NOTE — ED TRIAGE NOTES
Patient arrives to ED where he was visiting a patient and had a syncopal episode.   Patient pale and diaphoretic, HR 40s initially and BP 70/40

## 2023-11-18 NOTE — ED NOTES

## 2023-11-19 LAB
EKG ATRIAL RATE: 49 BPM
EKG DIAGNOSIS: NORMAL
EKG P AXIS: 55 DEGREES
EKG P-R INTERVAL: 190 MS
EKG Q-T INTERVAL: 446 MS
EKG QRS DURATION: 80 MS
EKG QTC CALCULATION (BAZETT): 402 MS
EKG R AXIS: 88 DEGREES
EKG T AXIS: 75 DEGREES
EKG VENTRICULAR RATE: 49 BPM

## 2024-04-25 ENCOUNTER — TELEPHONE (OUTPATIENT)
Age: 74
End: 2024-04-25

## 2024-04-25 RX ORDER — ISOSORBIDE DINITRATE 20 MG/1
20 TABLET ORAL DAILY
Qty: 90 TABLET | Refills: 3 | Status: SHIPPED | OUTPATIENT
Start: 2024-04-25

## 2024-04-25 NOTE — TELEPHONE ENCOUNTER
Patient wants to know if he should continue taking isosorbide dinitrate 20mg.    Patient # [phone]

## 2024-04-25 NOTE — TELEPHONE ENCOUNTER
Patient wants to know if he should continue taking isosorbide dinitrate 20mg.    Patient # [phone]    ################################    Spoke with the pt, identified the pt with name and .  Pt stated that [facility] said that the refill was denied. I informed the pt that there wasn't a request submitted, but I would be happy to send in a refill. The pt expressed understanding and agreement. Pt has no further questions or concerns at this time.      Refill per VO of Dr. Reymunod Maciel:    Visit date not found    Future Appointments   Date Time Provider Department Center   2024  3:00 PM Reymundo Maciel MD [facility]       Requested Prescriptions     Pending Prescriptions Disp Refills    isosorbide dinitrate (ISORDIL) 20 MG tablet 90 tablet 0     Sig: Take 1 tablet by mouth daily

## 2024-04-29 ENCOUNTER — OFFICE VISIT (OUTPATIENT)
Age: 74
End: 2024-04-29
Payer: MEDICARE

## 2024-04-29 VITALS
HEART RATE: 60 BPM | SYSTOLIC BLOOD PRESSURE: 128 MMHG | OXYGEN SATURATION: 98 % | DIASTOLIC BLOOD PRESSURE: 60 MMHG | BODY MASS INDEX: 27.7 KG/M2 | WEIGHT: 187 LBS | HEIGHT: 69 IN

## 2024-04-29 DIAGNOSIS — E78.5 HYPERLIPIDEMIA, UNSPECIFIED HYPERLIPIDEMIA TYPE: ICD-10-CM

## 2024-04-29 DIAGNOSIS — I25.10 ATHEROSCLEROSIS OF NATIVE CORONARY ARTERY OF NATIVE HEART WITHOUT ANGINA PECTORIS: Primary | ICD-10-CM

## 2024-04-29 DIAGNOSIS — I10 PRIMARY HYPERTENSION: ICD-10-CM

## 2024-04-29 DIAGNOSIS — I25.10 CORONARY ARTERY DISEASE INVOLVING NATIVE CORONARY ARTERY OF NATIVE HEART WITHOUT ANGINA PECTORIS: ICD-10-CM

## 2024-04-29 PROCEDURE — 99214 OFFICE O/P EST MOD 30 MIN: CPT | Performed by: INTERNAL MEDICINE

## 2024-04-29 PROCEDURE — 3078F DIAST BP <80 MM HG: CPT | Performed by: INTERNAL MEDICINE

## 2024-04-29 PROCEDURE — 1123F ACP DISCUSS/DSCN MKR DOCD: CPT | Performed by: INTERNAL MEDICINE

## 2024-04-29 PROCEDURE — G8427 DOCREV CUR MEDS BY ELIG CLIN: HCPCS | Performed by: INTERNAL MEDICINE

## 2024-04-29 PROCEDURE — 3017F COLORECTAL CA SCREEN DOC REV: CPT | Performed by: INTERNAL MEDICINE

## 2024-04-29 PROCEDURE — 3074F SYST BP LT 130 MM HG: CPT | Performed by: INTERNAL MEDICINE

## 2024-04-29 PROCEDURE — 1036F TOBACCO NON-USER: CPT | Performed by: INTERNAL MEDICINE

## 2024-04-29 PROCEDURE — G8419 CALC BMI OUT NRM PARAM NOF/U: HCPCS | Performed by: INTERNAL MEDICINE

## 2024-04-29 NOTE — PROGRESS NOTES
had concerns including Coronary Artery Disease and Follow-Up from Hospital (ED 11/17/23- syncope).    Vitals:    04/29/24 1502   BP: 128/60   Site: Left Upper Arm   Position: Sitting   Pulse: 60   SpO2: 98%   Weight: 84.8 kg (187 lb)   Height: 1.753 m (5' 9\")      Questions dosage for Crestor    Chest pain No    Refills No        1. Have you been to the ER, urgent care clinic since your last visit? No       Hospitalized since your last visit? No       Where?        Date?

## 2024-04-29 NOTE — PROGRESS NOTES
Office Follow-up    NAME: Vaughn Beauchamp Jr.   :  1950  MRM:  [id number]    Date:  24        Assessment and Plan:     CAD (2022), LAD 99% status post PCI, left main mild distal left main disease, obtuse marginal ostial 50 to 60% stenosis, OM 2 mild disease, OM 3 mild disease, PDA mild diffuse disease: Completed Brillinta. Now on Isosorbide.  We will continue aspirin, rosuvastatin and lisinopril.  In past LDL was 53.   Hypertension: Blood pressure is controlled.  Continue lisinopril.    See Dr. Maciel in 1 yr.             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        ATTENTION:   This medical record was transcribed using an electronic medical records/speech recognition system.  Although proofread, it may and can contain electronic, spelling and other errors.  Corrections may be executed at a later time.  Please feel free to contact us for any clarifications as needed.      Subjective:     No chest pain, SOB, Palpitaiotns.   ---------------  Vaughn Beauchamp Jr., a 72 y.o. year-old who presents for followup.  He underwent left heart catheterization on 2022 and was found to have 99% mid LAD lesion.  He underwent PCI with stenting.  He is returning today for follow-up.  Overall he feels okay without any significant chest pain.    ---------------    22:   HPI: Vaughn Beauchamp is a 71 y.o. male with no significant past medical history is here for evaluation of symptoms of chest pain.  Symptoms started few months ago and the first episode was in the 2021 when he had an episode of chest pain which lasted for few minutes it was moderate intensity however it resolved spontaneously and thereafter he did not have symptoms until recentlyOn  while he was walking his dog has symptoms of similar chest pain which improved upon resting.  He had another bout of chest pain the following day while he was active and associated with the symptoms of chest pain he also felt some tingling or

## 2025-04-18 RX ORDER — ISOSORBIDE DINITRATE 20 MG/1
20 TABLET ORAL DAILY
Qty: 90 TABLET | Refills: 3 | Status: SHIPPED | OUTPATIENT
Start: 2025-04-18

## 2025-04-18 NOTE — TELEPHONE ENCOUNTER
Refill per VO of Dr. Reymundo Maciel:    Visit date not found    Future Appointments   Date Time Provider Department Center   4/30/2025  4:20 PM Reymundo Maciel MD [facility]       Requested Prescriptions     Pending Prescriptions Disp Refills    isosorbide dinitrate (ISORDIL) 20 MG tablet [Pharmacy Med Name: ISOSORBIDE DINITRATE 20MG TABLETS] 90 tablet 3     Sig: TAKE 1 TABLET BY MOUTH DAILY

## 2025-04-30 ENCOUNTER — OFFICE VISIT (OUTPATIENT)
Age: 75
End: 2025-04-30
Payer: MEDICARE

## 2025-04-30 VITALS
HEIGHT: 69 IN | RESPIRATION RATE: 17 BRPM | DIASTOLIC BLOOD PRESSURE: 60 MMHG | HEART RATE: 66 BPM | BODY MASS INDEX: 27.25 KG/M2 | SYSTOLIC BLOOD PRESSURE: 92 MMHG | OXYGEN SATURATION: 96 % | WEIGHT: 184 LBS

## 2025-04-30 DIAGNOSIS — I25.10 CORONARY ARTERY DISEASE INVOLVING NATIVE CORONARY ARTERY OF NATIVE HEART WITHOUT ANGINA PECTORIS: Primary | ICD-10-CM

## 2025-04-30 DIAGNOSIS — E78.5 HYPERLIPIDEMIA, UNSPECIFIED HYPERLIPIDEMIA TYPE: ICD-10-CM

## 2025-04-30 DIAGNOSIS — I10 PRIMARY HYPERTENSION: ICD-10-CM

## 2025-04-30 PROCEDURE — 1159F MED LIST DOCD IN RCRD: CPT | Performed by: INTERNAL MEDICINE

## 2025-04-30 PROCEDURE — 1126F AMNT PAIN NOTED NONE PRSNT: CPT | Performed by: INTERNAL MEDICINE

## 2025-04-30 PROCEDURE — 99214 OFFICE O/P EST MOD 30 MIN: CPT | Performed by: INTERNAL MEDICINE

## 2025-04-30 PROCEDURE — 3078F DIAST BP <80 MM HG: CPT | Performed by: INTERNAL MEDICINE

## 2025-04-30 PROCEDURE — 1036F TOBACCO NON-USER: CPT | Performed by: INTERNAL MEDICINE

## 2025-04-30 PROCEDURE — 1123F ACP DISCUSS/DSCN MKR DOCD: CPT | Performed by: INTERNAL MEDICINE

## 2025-04-30 PROCEDURE — 3017F COLORECTAL CA SCREEN DOC REV: CPT | Performed by: INTERNAL MEDICINE

## 2025-04-30 PROCEDURE — G8427 DOCREV CUR MEDS BY ELIG CLIN: HCPCS | Performed by: INTERNAL MEDICINE

## 2025-04-30 PROCEDURE — G8419 CALC BMI OUT NRM PARAM NOF/U: HCPCS | Performed by: INTERNAL MEDICINE

## 2025-04-30 PROCEDURE — 3074F SYST BP LT 130 MM HG: CPT | Performed by: INTERNAL MEDICINE

## 2025-04-30 NOTE — PROGRESS NOTES
had concerns including Coronary Artery Disease and Hypertension.    Vitals:    04/30/25 1618   BP: 92/60   BP Site: Left Upper Arm   Patient Position: Sitting   Pulse: 66   Resp: 17   SpO2: 96%   Weight: 83.5 kg (184 lb)   Height: 1.753 m (5' 9\")        Chest pain No    Refills No        1. Have you been to the ER, urgent care clinic since your last visit? No       Hospitalized since your last visit? No       Where?        Date?  
(TYLENOL) 325 MG tablet Take 500 mg by mouth every 4 hours as needed (Patient not taking: Reported on 4/30/2025)       No current facility-administered medications for this visit.         No current facility-administered medications for this visit.      Diagnostic Data Review:       No specialty comments available.      Lab Review:     Lab Results   Component Value Date/Time    Cholesterol, total 120 04/07/2022 01:17 AM    HDL Cholesterol 36 04/07/2022 01:17 AM    LDL, calculated 53.6 04/07/2022 01:17 AM    Triglyceride 152 (H) 04/07/2022 01:17 AM    CHOL/HDL Ratio 3.3 04/07/2022 01:17 AM     Lab Results   Component Value Date/Time    Creatinine 1.06 01/29/2023 06:24 AM     Lab Results   Component Value Date/Time    BUN 21 (H) 01/29/2023 06:24 AM     Lab Results   Component Value Date/Time    Potassium 4.0 01/29/2023 06:24 AM     No results found for: HBA1C, TIH8PIVN, RKX7KJVH  Lab Results   Component Value Date/Time    HGB 14.1 01/29/2023 06:24 AM     Lab Results   Component Value Date/Time    PLATELET 164 01/29/2023 06:24 AM     No results for input(s): CPK, CKMB, TROIQ in the last 72 hours.    No lab exists for component: CKQMB, CPKMB             ___________________________________________________    Reymundo Maciel MD, FACC

## (undated) DEVICE — CATH GUID COR JL3.5 6FR 100CM -- LAUNCHER

## (undated) DEVICE — Device: Brand: ASAHI SION BLUE

## (undated) DEVICE — NC TREK CORONARY DILATATION CATHETER 3.0 MM X 15 MM / RAPID-EXCHANGE: Brand: NC TREK

## (undated) DEVICE — TR BAND RADIAL ARTERY COMPRESSION DEVICE: Brand: TR BAND

## (undated) DEVICE — CATH GUID COR EB35 6FR 100CM -- LAUNCHER

## (undated) DEVICE — Device: Brand: EAGLE EYE PLATINUM RX DIGITAL IVUS CATHETER

## (undated) DEVICE — SUPPORT WRST AD W3.5XL9IN DIA14.5IN ART SFT ADJ HK AND LOOP

## (undated) DEVICE — TUBING PRSS MON L12IN PVC RIG NONEXPANDING M TO FEM CONN

## (undated) DEVICE — TREK CORONARY DILATATION CATHETER 3.0 MM X 12 MM / RAPID-EXCHANGE: Brand: TREK

## (undated) DEVICE — CATHETER BLLN 142CM  SPRINTER LEGEND RX 2MM X 12MM GW

## (undated) DEVICE — HEART CATH-SFMC: Brand: MEDLINE INDUSTRIES, INC.

## (undated) DEVICE — GLIDESHEATH SLENDER STAINLESS STEEL KIT: Brand: GLIDESHEATH SLENDER

## (undated) DEVICE — PINNACLE INTRODUCER SHEATH: Brand: PINNACLE

## (undated) DEVICE — Z DUPLICATE USE 2103554 VALVE HEMOSTATIC BLEEDBK CTRL COPILOT

## (undated) DEVICE — NC TREK CORONARY DILATATION CATHETER 4.0 MM X 12 MM / RAPID-EXCHANGE: Brand: NC TREK

## (undated) DEVICE — GUIDE CATH CONVEY 5FR JR4 -- 39228-686

## (undated) DEVICE — MINI TREK CORONARY DILATATION CATHETER 1.20 MM X 15 MM / RAPID-EXCHANGE: Brand: MINI TREK

## (undated) DEVICE — CATH GUID COR EB35 7FR 100CM -- LAUNCHER

## (undated) DEVICE — CATH GUID COR EB40 7FR 100CM -- LAUNCHER

## (undated) DEVICE — NC TREK CORONARY DILATATION CATHETER 3.5 MM X 15 MM / RAPID-EXCHANGE: Brand: NC TREK

## (undated) DEVICE — PERCLOSE PROGLIDE™ SUTURE-MEDIATED CLOSURE SYSTEM: Brand: PERCLOSE PROGLIDE™

## (undated) DEVICE — GUIDE CATH CONVEY 5FR JL3.5 -- 39228-661

## (undated) DEVICE — VALVE ANGIO ID0.11IN HEMSTAT MTL GUID WIRE INSRT TOOL AND

## (undated) DEVICE — INTRODUCER SHTH 5 FRX30 CM 7 CM W/ NIT WIRE REG MICRO-STICK

## (undated) DEVICE — MEDI-TRACE CADENCE ADULT, DEFIBRILLATION ELECTRODE -RTS  (10 PR/PK) - PHYSIO-CONTROL: Brand: MEDI-TRACE CADENCE

## (undated) DEVICE — DEVICE INFL 20ML 30ATM DGT FLD DISPNS SYR W ACCESSPLUS BLU